# Patient Record
Sex: FEMALE | Race: WHITE | NOT HISPANIC OR LATINO | URBAN - METROPOLITAN AREA
[De-identification: names, ages, dates, MRNs, and addresses within clinical notes are randomized per-mention and may not be internally consistent; named-entity substitution may affect disease eponyms.]

---

## 2018-12-07 ENCOUNTER — INPATIENT (INPATIENT)
Facility: HOSPITAL | Age: 83
LOS: 2 days | Discharge: HOME | End: 2018-12-10
Attending: INTERNAL MEDICINE | Admitting: INTERNAL MEDICINE

## 2018-12-07 VITALS
SYSTOLIC BLOOD PRESSURE: 154 MMHG | HEART RATE: 65 BPM | OXYGEN SATURATION: 98 % | TEMPERATURE: 96 F | RESPIRATION RATE: 18 BRPM | DIASTOLIC BLOOD PRESSURE: 70 MMHG

## 2018-12-07 DIAGNOSIS — Z95.0 PRESENCE OF CARDIAC PACEMAKER: Chronic | ICD-10-CM

## 2018-12-07 LAB
ALBUMIN SERPL ELPH-MCNC: 3.9 G/DL — SIGNIFICANT CHANGE UP (ref 3.5–5.2)
ALP SERPL-CCNC: 53 U/L — SIGNIFICANT CHANGE UP (ref 30–115)
ALT FLD-CCNC: 10 U/L — SIGNIFICANT CHANGE UP (ref 0–41)
ANION GAP SERPL CALC-SCNC: 14 MMOL/L — SIGNIFICANT CHANGE UP (ref 7–14)
APTT BLD: 36.3 SEC — SIGNIFICANT CHANGE UP (ref 27–39.2)
AST SERPL-CCNC: 22 U/L — SIGNIFICANT CHANGE UP (ref 0–41)
BASOPHILS # BLD AUTO: 0.04 K/UL — SIGNIFICANT CHANGE UP (ref 0–0.2)
BASOPHILS NFR BLD AUTO: 0.7 % — SIGNIFICANT CHANGE UP (ref 0–1)
BILIRUB SERPL-MCNC: 0.5 MG/DL — SIGNIFICANT CHANGE UP (ref 0.2–1.2)
BUN SERPL-MCNC: 15 MG/DL — SIGNIFICANT CHANGE UP (ref 10–20)
CALCIUM SERPL-MCNC: 9.1 MG/DL — SIGNIFICANT CHANGE UP (ref 8.5–10.1)
CHLORIDE SERPL-SCNC: 103 MMOL/L — SIGNIFICANT CHANGE UP (ref 98–110)
CK MB CFR SERPL CALC: 1.6 NG/ML — SIGNIFICANT CHANGE UP (ref 0.6–6.3)
CK SERPL-CCNC: 73 U/L — SIGNIFICANT CHANGE UP (ref 0–225)
CO2 SERPL-SCNC: 24 MMOL/L — SIGNIFICANT CHANGE UP (ref 17–32)
CREAT SERPL-MCNC: 0.9 MG/DL — SIGNIFICANT CHANGE UP (ref 0.7–1.5)
D DIMER BLD IA.RAPID-MCNC: 138 NG/ML DDU — SIGNIFICANT CHANGE UP (ref 0–230)
EOSINOPHIL # BLD AUTO: 0.11 K/UL — SIGNIFICANT CHANGE UP (ref 0–0.7)
EOSINOPHIL NFR BLD AUTO: 1.9 % — SIGNIFICANT CHANGE UP (ref 0–8)
GLUCOSE SERPL-MCNC: 91 MG/DL — SIGNIFICANT CHANGE UP (ref 70–99)
HCT VFR BLD CALC: 38.8 % — SIGNIFICANT CHANGE UP (ref 37–47)
HCT VFR BLD CALC: 39.2 % — SIGNIFICANT CHANGE UP (ref 37–47)
HGB BLD-MCNC: 13 G/DL — SIGNIFICANT CHANGE UP (ref 12–16)
HGB BLD-MCNC: 13.1 G/DL — SIGNIFICANT CHANGE UP (ref 12–16)
IMM GRANULOCYTES NFR BLD AUTO: 0.3 % — SIGNIFICANT CHANGE UP (ref 0.1–0.3)
INR BLD: 1.34 RATIO — HIGH (ref 0.65–1.3)
LYMPHOCYTES # BLD AUTO: 1.28 K/UL — SIGNIFICANT CHANGE UP (ref 1.2–3.4)
LYMPHOCYTES # BLD AUTO: 21.7 % — SIGNIFICANT CHANGE UP (ref 20.5–51.1)
MAGNESIUM SERPL-MCNC: 2.3 MG/DL — SIGNIFICANT CHANGE UP (ref 1.8–2.4)
MCHC RBC-ENTMCNC: 28.7 PG — SIGNIFICANT CHANGE UP (ref 27–31)
MCHC RBC-ENTMCNC: 28.8 PG — SIGNIFICANT CHANGE UP (ref 27–31)
MCHC RBC-ENTMCNC: 33.4 G/DL — SIGNIFICANT CHANGE UP (ref 32–37)
MCHC RBC-ENTMCNC: 33.5 G/DL — SIGNIFICANT CHANGE UP (ref 32–37)
MCV RBC AUTO: 86 FL — SIGNIFICANT CHANGE UP (ref 81–99)
MCV RBC AUTO: 86 FL — SIGNIFICANT CHANGE UP (ref 81–99)
MONOCYTES # BLD AUTO: 0.43 K/UL — SIGNIFICANT CHANGE UP (ref 0.1–0.6)
MONOCYTES NFR BLD AUTO: 7.3 % — SIGNIFICANT CHANGE UP (ref 1.7–9.3)
NEUTROPHILS # BLD AUTO: 4.03 K/UL — SIGNIFICANT CHANGE UP (ref 1.4–6.5)
NEUTROPHILS NFR BLD AUTO: 68.1 % — SIGNIFICANT CHANGE UP (ref 42.2–75.2)
NRBC # BLD: 0 /100 WBCS — SIGNIFICANT CHANGE UP (ref 0–0)
NRBC # BLD: 0 /100 WBCS — SIGNIFICANT CHANGE UP (ref 0–0)
NT-PROBNP SERPL-SCNC: 207 PG/ML — SIGNIFICANT CHANGE UP (ref 0–300)
PHOSPHATE SERPL-MCNC: 3.5 MG/DL — SIGNIFICANT CHANGE UP (ref 2.1–4.9)
PLATELET # BLD AUTO: 180 K/UL — SIGNIFICANT CHANGE UP (ref 130–400)
PLATELET # BLD AUTO: 196 K/UL — SIGNIFICANT CHANGE UP (ref 130–400)
POTASSIUM SERPL-MCNC: 5 MMOL/L — SIGNIFICANT CHANGE UP (ref 3.5–5)
POTASSIUM SERPL-SCNC: 5 MMOL/L — SIGNIFICANT CHANGE UP (ref 3.5–5)
PROT SERPL-MCNC: 6.9 G/DL — SIGNIFICANT CHANGE UP (ref 6–8)
PROTHROM AB SERPL-ACNC: 15.4 SEC — HIGH (ref 9.95–12.87)
RBC # BLD: 4.51 M/UL — SIGNIFICANT CHANGE UP (ref 4.2–5.4)
RBC # BLD: 4.56 M/UL — SIGNIFICANT CHANGE UP (ref 4.2–5.4)
RBC # FLD: 14.7 % — HIGH (ref 11.5–14.5)
RBC # FLD: 14.7 % — HIGH (ref 11.5–14.5)
SODIUM SERPL-SCNC: 141 MMOL/L — SIGNIFICANT CHANGE UP (ref 135–146)
TROPONIN T SERPL-MCNC: <0.01 NG/ML — SIGNIFICANT CHANGE UP
WBC # BLD: 5.91 K/UL — SIGNIFICANT CHANGE UP (ref 4.8–10.8)
WBC # BLD: 6.31 K/UL — SIGNIFICANT CHANGE UP (ref 4.8–10.8)
WBC # FLD AUTO: 5.91 K/UL — SIGNIFICANT CHANGE UP (ref 4.8–10.8)
WBC # FLD AUTO: 6.31 K/UL — SIGNIFICANT CHANGE UP (ref 4.8–10.8)

## 2018-12-07 RX ORDER — ASPIRIN/CALCIUM CARB/MAGNESIUM 324 MG
325 TABLET ORAL ONCE
Qty: 0 | Refills: 0 | Status: COMPLETED | OUTPATIENT
Start: 2018-12-07 | End: 2018-12-07

## 2018-12-07 RX ORDER — ATORVASTATIN CALCIUM 80 MG/1
80 TABLET, FILM COATED ORAL AT BEDTIME
Qty: 0 | Refills: 0 | Status: DISCONTINUED | OUTPATIENT
Start: 2018-12-07 | End: 2018-12-10

## 2018-12-07 RX ORDER — ASPIRIN/CALCIUM CARB/MAGNESIUM 324 MG
81 TABLET ORAL DAILY
Qty: 0 | Refills: 0 | Status: DISCONTINUED | OUTPATIENT
Start: 2018-12-08 | End: 2018-12-10

## 2018-12-07 RX ORDER — VANCOMYCIN HCL 1 G
1000 VIAL (EA) INTRAVENOUS ONCE
Qty: 0 | Refills: 0 | Status: DISCONTINUED | OUTPATIENT
Start: 2018-12-07 | End: 2018-12-07

## 2018-12-07 RX ORDER — PANTOPRAZOLE SODIUM 20 MG/1
40 TABLET, DELAYED RELEASE ORAL DAILY
Qty: 0 | Refills: 0 | Status: DISCONTINUED | OUTPATIENT
Start: 2018-12-07 | End: 2018-12-10

## 2018-12-07 RX ORDER — LEVOTHYROXINE SODIUM 125 MCG
25 TABLET ORAL DAILY
Qty: 0 | Refills: 0 | Status: DISCONTINUED | OUTPATIENT
Start: 2018-12-07 | End: 2018-12-10

## 2018-12-07 RX ORDER — CHLORHEXIDINE GLUCONATE 213 G/1000ML
1 SOLUTION TOPICAL
Qty: 0 | Refills: 0 | Status: DISCONTINUED | OUTPATIENT
Start: 2018-12-07 | End: 2018-12-10

## 2018-12-07 RX ORDER — MORPHINE SULFATE 50 MG/1
4 CAPSULE, EXTENDED RELEASE ORAL ONCE
Qty: 0 | Refills: 0 | Status: DISCONTINUED | OUTPATIENT
Start: 2018-12-07 | End: 2018-12-07

## 2018-12-07 RX ORDER — METOPROLOL TARTRATE 50 MG
25 TABLET ORAL AT BEDTIME
Qty: 0 | Refills: 0 | Status: DISCONTINUED | OUTPATIENT
Start: 2018-12-07 | End: 2018-12-10

## 2018-12-07 RX ORDER — RIVAROXABAN 15 MG-20MG
20 KIT ORAL EVERY 24 HOURS
Qty: 0 | Refills: 0 | Status: DISCONTINUED | OUTPATIENT
Start: 2018-12-07 | End: 2018-12-10

## 2018-12-07 RX ADMIN — RIVAROXABAN 20 MILLIGRAM(S): KIT at 21:37

## 2018-12-07 RX ADMIN — Medication 325 MILLIGRAM(S): at 13:08

## 2018-12-07 RX ADMIN — PANTOPRAZOLE SODIUM 40 MILLIGRAM(S): 20 TABLET, DELAYED RELEASE ORAL at 21:46

## 2018-12-07 RX ADMIN — Medication 25 MILLIGRAM(S): at 21:37

## 2018-12-07 RX ADMIN — ATORVASTATIN CALCIUM 80 MILLIGRAM(S): 80 TABLET, FILM COATED ORAL at 21:37

## 2018-12-07 NOTE — H&P ADULT - NSHPPHYSICALEXAM_GEN_ALL_CORE
PHYSICAL EXAM:  GENERAL: NAD, obese  HEAD:  Atraumatic, Normocephalic  EYES: EOMI, PERRLA,   ENMT: No tonsillar erythema, exudates, or enlargement  NECK: Supple, No JVD, Normal thyroid  NERVOUS SYSTEM:  Alert & Oriented X3  CHEST/LUNG: Clear to percussion bilaterally; No rales, rhonchi, wheezing, or rubs  HEART: Regular rate and rhythm; No murmurs, rubs, or gallops  ABDOMEN: Soft, Nontender, Nondistended; Bowel sounds present  EXTREMITIES:  2+ Peripheral Pulses, No clubbing, cyanosis, or edema  LYMPH: No lymphadenopathy noted  SKIN: No rashes or lesions

## 2018-12-07 NOTE — H&P ADULT - ASSESSMENT
82 yo F presents with worsened intensity of left-sided chest pain occurring yesterday at 06:00AM, currently asymptomatic, chest pain has occurred intermittently for the past several years.    Atypical Chest Pain, rule out Acute Coronary Syndrome  -EKG shows atrial paced rhythm, negative ischemic changes  -Cardiac enzymes negative x2, follow up subsequent set  -Last Nuclear Stress completed on 2/22/16 normal, EF 70-75%  -No overt sign of volume overload  -Awaiting Cardiology evaluation, Dr. Soto  -Continue Metoprolol, Statin, received 325mg of ASA x1, will continue ASA-81mg at this time  -F/U 2D-Echo  -Check HgbA1C%, lipid panel    Hypothyroidism  -Continue Levothyroxine    Code Status: Full Code  Disposition Home when stable 82 yo F presents with worsened intensity of left-sided chest pain occurring yesterday at 06:00AM, currently asymptomatic, chest pain has occurred intermittently for the past several years.    Atypical Chest Pain, rule out Acute Coronary Syndrome  -EKG shows atrial paced rhythm, negative ischemic changes  -Cardiac enzymes negative x2, follow up subsequent set  -Last Nuclear Stress completed on 2/22/16 normal, EF 70-75%  -No overt sign of volume overload  -Awaiting Cardiology evaluation, Dr. Soto  -Continue Metoprolol, Statin, received 325mg of ASA x1, will continue ASA-81mg at this time  -F/U 2D-Echo  -Check TSH, HgbA1C%, lipid panel    Hypothyroidism  -Continue Levothyroxine    Code Status: Full Code  Disposition Home when stable

## 2018-12-07 NOTE — ED PROVIDER NOTE - OBJECTIVE STATEMENT
82 y/o F, h/o PM, afib, on xaralto, presents with intermittent L sided CP described as tightness/squeezing onset yesterday while sitting. pt noticed her CP is minimally relieved with rest. Pt last saw her cardiologist-Dr. Soto last month and last had a stress test about 2 years ago and had a normal EF. No simialr symptoms in the past. denies SOB, palpitations, diaphoresis, n/v, abd pain. 82 y/o F, h/o PM, afib, on xaralto, presents with intermittent L sided CP described as tightness/squeezing onset yesterday while sitting. pt noticed her CP is minimally relieved with rest. Pt last saw her cardiologist-Dr. Soto last month and last had a stress test about 2 years ago and had a normal EF. no releif of symptoms with NTG patch. No simialr symptoms in the past. denies SOB, palpitations, diaphoresis, n/v, abd pain.

## 2018-12-07 NOTE — ED ADULT NURSE NOTE - NSIMPLEMENTINTERV_GEN_ALL_ED
Implemented All Universal Safety Interventions:  [place] to call system. Call bell, personal items and telephone within reach. Instruct patient to call for assistance. Room bathroom lighting operational. Non-slip footwear when patient is off stretcher. Physically safe environment: no spills, clutter or unnecessary equipment. Stretcher in lowest position, wheels locked, appropriate side rails in place.

## 2018-12-07 NOTE — H&P ADULT - ATTENDING COMMENTS
Patient is seen and examined independently at bedside. I agree with the resident's note, history and plan as above.    PHYSICAL EXAM:  CONSTITUTIONAL: NAD, well-groomed, well-developed.  HEAD:  Atraumatic, Normocephalic.  EYES: EOMI, PERRLA, conjunctiva and sclera clear.  ENMT: Moist mucous membranes, No lesions.  NERVOUS SYSTEM:  Alert & Oriented X3, Good concentration; No limb weakness or numbness.  RESPIRATORY: Clear to ascultation bilaterally; No rales, rhonchi, wheezing, or rubs.  CARDIOVASCULAR: Regular rate and rhythm; No murmurs, rubs, or gallops.  GASTROINTESTINAL: Soft, Nontender, Nondistended; Bowel sounds present.  MUSCULOSKELETAL: No joint swelling or tenderness. No neck or back pain.  EXTREMITIES: No clubbing, cyanosis, or edema.  LYMPH: No cervical lymphadenopathy noted.  SKIN: No rashes or lesions.    # Chest pain   - EKG and telemetry independent reviewed, atrial paced rhythm with no ischemic changes HR 60s  - telemonitoring  - started on aspirin, c/w statin and toprol xl  - order for 2DEcho  - check TSH, HgbA1C%, lipid panel  - Cardiology evaluation for possible need for stress test    # Left lower lobe opacity vs. effusion on CXR, previously read as atelectasis in CXR 2016  - no fever, cough, sputum production, no significant finding on physical exam, will hold antibiotics for now  - monitor for symptoms    # h/o Atrial fibrillation  - c/w xarelto and toprol xl    # Hypothyroidism  - c/w levothyroxine    # Hyperlipidemia  - c/w statin    All labs, radiologic studies, vitals, orders and medications list reviewed. Patient is seen and examined independently at bedside. I agree with the resident's note, history and plan as above.    PHYSICAL EXAM:  CONSTITUTIONAL: NAD, well-groomed, well-developed.  HEAD:  Atraumatic, Normocephalic.  EYES: EOMI, PERRLA, conjunctiva and sclera clear.  ENMT: Moist mucous membranes, No lesions.  NERVOUS SYSTEM:  Alert & Oriented X3, Good concentration; No limb weakness or numbness.  RESPIRATORY: Clear to ascultation bilaterally; No rales, rhonchi, wheezing, or rubs.  CARDIOVASCULAR: Regular rate and rhythm; No murmurs, rubs, or gallops.  GASTROINTESTINAL: Soft, Nontender, Nondistended; Bowel sounds present.  MUSCULOSKELETAL: No joint swelling or tenderness. No neck or back pain.  EXTREMITIES: No clubbing, cyanosis, or edema.  LYMPH: No cervical lymphadenopathy noted.  SKIN: No rashes or lesions.    # Chest pain   - EKG and telemetry independent reviewed, atrial paced rhythm with no ischemic changes HR 60s  - telemonitoring  - started on aspirin, c/w statin and toprol xl  - order for 2DEcho  - check TSH, HgbA1C%, lipid panel  - Cardiology evaluation for possible need for stress test    # Left lower lobe opacity vs. effusion on CXR, previously read as atelectasis in CXR 2016  - no fever, cough, sputum production, no significant finding on physical exam, will hold antibiotics for now  - monitor for symptoms    # h/o Atrial fibrillation  - c/w xarelto and toprol xl    # Hypothyroidism  - c/w levothyroxine    # Hyperlipidemia  - c/w statin    # GERD  - pt takes zantac or prilosec prn at home, currently symptomatic  - will start on protonix daily for now    All labs, radiologic studies, vitals, orders and medications list reviewed.

## 2018-12-07 NOTE — H&P ADULT - NSHPREVIEWOFSYSTEMS_GEN_ALL_CORE
REVIEW OF SYSTEMS:  CONSTITUTIONAL: No fever, weight loss, or fatigue  EYES: No eye pain, visual disturbances, or discharge  ENMT:  No difficulty hearing, tinnitus, vertigo; No sinus or throat pain  NECK: No pain or stiffness  BREASTS: No pain, masses, or nipple discharge  RESPIRATORY: No cough, wheezing, chills or hemoptysis; No shortness of breath  CARDIOVASCULAR: positive chest pain, negative palpitations, dizziness, or leg swelling  GASTROINTESTINAL: No abdominal or epigastric pain. No nausea, vomiting, or hematemesis; No diarrhea or constipation. No melena or hematochezia.  GENITOURINARY: No dysuria, frequency, hematuria, or incontinence  NEUROLOGICAL: No headaches, memory loss, loss of strength, numbness, or tremors  SKIN: No itching, burning, rashes, or lesions   LYMPH NODES: No enlarged glands  ENDOCRINE: No heat or cold intolerance; No hair loss  MUSCULOSKELETAL: No joint pain or swelling; No muscle, back, or extremity pain  PSYCHIATRIC: No depression, anxiety, mood swings, or difficulty sleeping  HEME/LYMPH: No easy bruising, or bleeding gums  ALLERGY AND IMMUNOLOGIC: No hives or eczema

## 2018-12-07 NOTE — H&P ADULT - NSHPLABSRESULTS_GEN_ALL_CORE
13.0   5.91  )-----------( 196      ( 07 Dec 2018 11:37 )             38.8       12-07    141  |  103  |  15  ----------------------------<  91  5.0   |  24  |  0.9    Ca    9.1      07 Dec 2018 11:23  Phos  3.5     12-07  Mg     2.3     12-07    TPro  6.9  /  Alb  3.9  /  TBili  0.5  /  DBili  x   /  AST  22  /  ALT  10  /  AlkPhos  53  12-07          PT/INR - ( 07 Dec 2018 11:23 )   PT: 15.40 sec;   INR: 1.34 ratio         PTT - ( 07 Dec 2018 11:23 )  PTT:36.3 sec      CARDIAC MARKERS ( 07 Dec 2018 11:37 )  x     / <0.01 ng/mL / x     / x     / x      CARDIAC MARKERS ( 07 Dec 2018 11:23 )  x     / <0.01 ng/mL / x     / x     / x

## 2018-12-07 NOTE — H&P ADULT - NSHPSOCIALHISTORY_GEN_ALL_CORE
Patient lives alone with home health aide visiting twice a week. She walks with a cane, denies history of tobacco, ETOH or illicit drug abuse.

## 2018-12-07 NOTE — H&P ADULT - FAMILY HISTORY
Sibling  Still living? Yes, Estimated age: Age Unknown  Family history of breast cancer, Age at diagnosis: Age Unknown     Mother  Still living? No  Family history of early CAD, Age at diagnosis: Age Unknown

## 2018-12-07 NOTE — H&P ADULT - HISTORY OF PRESENT ILLNESS
82 yo F with PMHx of AFIB on Xarelto presents with complaint of intermittent left sided chest pain beginning yesterday.  described as tightness/squeezing onset yesterday while sitting. pt noticed her CP is minimally relieved with rest. Pt last saw her cardiologist-Dr. Soto last month and last had a stress test about 2 years ago and had a normal EF. no releif of symptoms with NTG patch. No simialr symptoms in the past. denies SOB, palpitations, diaphoresis, n/v, abd pain. 82 yo F with PMHx of AFIB on Xarelto, Conduction Disorder s/p Pacemaker placement, Hypothyroidism, HLD presents with complaint of worsening left sided chest pain with intermittent radiation down left upper extremity occurring at 6AM yesterday morning. Patient endorses "squeezing" pain lasting for several minutes sometimes relieved by nitro patch (however is affected by side effects of headaches); patient describes intermittent chest pain occurring for the past "several years". Patient follows up with Dr. Soto, was seen last month with PPM interrogation was told she was doing fine with no changes to her medications. Patient denies palpitations, shortness of breath, fever, chills, nausea, vomiting, sick contacts, recent travel or upper respiratory tract symptoms (in light of Left Lower Lobe opacity seen on CXR). 84 yo F with PMHx of AFIB on Xarelto, Conduction Disorder s/p Pacemaker placement, Hypothyroidism, HLD presents with complaint of worsening left sided chest pain with intermittent radiation down left upper extremity occurring at 6AM yesterday morning. Patient endorses "squeezing" pain lasting for several minutes sometimes relieved by nitro patch (however is affected by side effects of headaches); patient describes intermittent chest pain occurring for the past "several years". Patient follows up with Dr. Soto, was seen last month with PPM interrogation was told she was doing fine with no changes to her medications. Patient denies palpitations, shortness of breath, fever, chills, nausea, vomiting, sick contacts, recent travel or upper respiratory tract symptoms (in light of Left Lower Lobe opacity seen on CXR--also noted on CXR in 2016).

## 2018-12-07 NOTE — ED PROVIDER NOTE - NS ED ROS FT
Review of Systems:  CONSTITUTIONAL: no fever  EYES: no photophobia, no blurred vision  ENT: no ear pain, no nasal discharge  RESPIRATORY: no shortness of breath, no cough  CARDIAC: +chest pain, no palpitations  GI: no abd pain, no nausea, no vomiting, no diarrhea, no constipation,   : no dysuria; no hematuria,   MUSCULOSKELETAL: no joint paint  NEUROLOGIC: no headache,   PSYCH: no anxiety, non suicidal,

## 2018-12-07 NOTE — H&P ADULT - PMH
Atrial fibrillation, unspecified type    Hyperlipidemia, unspecified hyperlipidemia type    Hypothyroidism, unspecified type

## 2018-12-08 LAB
ANION GAP SERPL CALC-SCNC: 16 MMOL/L — HIGH (ref 7–14)
BUN SERPL-MCNC: 17 MG/DL — SIGNIFICANT CHANGE UP (ref 10–20)
CALCIUM SERPL-MCNC: 8.9 MG/DL — SIGNIFICANT CHANGE UP (ref 8.5–10.1)
CHLORIDE SERPL-SCNC: 104 MMOL/L — SIGNIFICANT CHANGE UP (ref 98–110)
CHOLEST SERPL-MCNC: 228 MG/DL — HIGH (ref 100–200)
CK MB CFR SERPL CALC: 2 NG/ML — SIGNIFICANT CHANGE UP (ref 0.6–6.3)
CK SERPL-CCNC: 88 U/L — SIGNIFICANT CHANGE UP (ref 0–225)
CO2 SERPL-SCNC: 22 MMOL/L — SIGNIFICANT CHANGE UP (ref 17–32)
CREAT SERPL-MCNC: 0.9 MG/DL — SIGNIFICANT CHANGE UP (ref 0.7–1.5)
ESTIMATED AVERAGE GLUCOSE: 111 MG/DL — SIGNIFICANT CHANGE UP (ref 68–114)
GLUCOSE SERPL-MCNC: 91 MG/DL — SIGNIFICANT CHANGE UP (ref 70–99)
HBA1C BLD-MCNC: 5.5 % — SIGNIFICANT CHANGE UP (ref 4–5.6)
HCT VFR BLD CALC: 38.8 % — SIGNIFICANT CHANGE UP (ref 37–47)
HDLC SERPL-MCNC: 66 MG/DL — SIGNIFICANT CHANGE UP
HGB BLD-MCNC: 13 G/DL — SIGNIFICANT CHANGE UP (ref 12–16)
LIPID PNL WITH DIRECT LDL SERPL: 152 MG/DL — HIGH (ref 4–129)
MAGNESIUM SERPL-MCNC: 2.1 MG/DL — SIGNIFICANT CHANGE UP (ref 1.8–2.4)
MCHC RBC-ENTMCNC: 29 PG — SIGNIFICANT CHANGE UP (ref 27–31)
MCHC RBC-ENTMCNC: 33.5 G/DL — SIGNIFICANT CHANGE UP (ref 32–37)
MCV RBC AUTO: 86.4 FL — SIGNIFICANT CHANGE UP (ref 81–99)
NRBC # BLD: 0 /100 WBCS — SIGNIFICANT CHANGE UP (ref 0–0)
PLATELET # BLD AUTO: 180 K/UL — SIGNIFICANT CHANGE UP (ref 130–400)
POTASSIUM SERPL-MCNC: 4 MMOL/L — SIGNIFICANT CHANGE UP (ref 3.5–5)
POTASSIUM SERPL-SCNC: 4 MMOL/L — SIGNIFICANT CHANGE UP (ref 3.5–5)
RBC # BLD: 4.49 M/UL — SIGNIFICANT CHANGE UP (ref 4.2–5.4)
RBC # FLD: 14.7 % — HIGH (ref 11.5–14.5)
SODIUM SERPL-SCNC: 142 MMOL/L — SIGNIFICANT CHANGE UP (ref 135–146)
TOTAL CHOLESTEROL/HDL RATIO MEASUREMENT: 3.5 RATIO — LOW (ref 4–5.5)
TRIGL SERPL-MCNC: 78 MG/DL — SIGNIFICANT CHANGE UP (ref 10–149)
TROPONIN T SERPL-MCNC: <0.01 NG/ML — SIGNIFICANT CHANGE UP
TSH SERPL-MCNC: 3.57 UIU/ML — SIGNIFICANT CHANGE UP (ref 0.27–4.2)
WBC # BLD: 6.2 K/UL — SIGNIFICANT CHANGE UP (ref 4.8–10.8)
WBC # FLD AUTO: 6.2 K/UL — SIGNIFICANT CHANGE UP (ref 4.8–10.8)

## 2018-12-08 RX ORDER — DOCUSATE SODIUM 100 MG
100 CAPSULE ORAL THREE TIMES A DAY
Qty: 0 | Refills: 0 | Status: DISCONTINUED | OUTPATIENT
Start: 2018-12-08 | End: 2018-12-10

## 2018-12-08 RX ORDER — SENNA PLUS 8.6 MG/1
1 TABLET ORAL
Qty: 0 | Refills: 0 | Status: DISCONTINUED | OUTPATIENT
Start: 2018-12-08 | End: 2018-12-10

## 2018-12-08 RX ADMIN — SENNA PLUS 1 TABLET(S): 8.6 TABLET ORAL at 17:34

## 2018-12-08 RX ADMIN — Medication 25 MILLIGRAM(S): at 23:04

## 2018-12-08 RX ADMIN — Medication 25 MICROGRAM(S): at 06:38

## 2018-12-08 RX ADMIN — RIVAROXABAN 20 MILLIGRAM(S): KIT at 17:34

## 2018-12-08 RX ADMIN — ATORVASTATIN CALCIUM 80 MILLIGRAM(S): 80 TABLET, FILM COATED ORAL at 23:04

## 2018-12-08 RX ADMIN — PANTOPRAZOLE SODIUM 40 MILLIGRAM(S): 20 TABLET, DELAYED RELEASE ORAL at 12:16

## 2018-12-08 RX ADMIN — Medication 100 MILLIGRAM(S): at 15:00

## 2018-12-08 RX ADMIN — Medication 81 MILLIGRAM(S): at 12:16

## 2018-12-08 RX ADMIN — SENNA PLUS 1 TABLET(S): 8.6 TABLET ORAL at 06:38

## 2018-12-08 RX ADMIN — Medication 100 MILLIGRAM(S): at 06:38

## 2018-12-08 RX ADMIN — Medication 100 MILLIGRAM(S): at 23:04

## 2018-12-09 RX ADMIN — Medication 25 MICROGRAM(S): at 05:54

## 2018-12-09 RX ADMIN — SENNA PLUS 1 TABLET(S): 8.6 TABLET ORAL at 05:54

## 2018-12-09 RX ADMIN — Medication 100 MILLIGRAM(S): at 05:54

## 2018-12-09 RX ADMIN — Medication 100 MILLIGRAM(S): at 12:20

## 2018-12-09 RX ADMIN — Medication 25 MILLIGRAM(S): at 21:56

## 2018-12-09 RX ADMIN — ATORVASTATIN CALCIUM 80 MILLIGRAM(S): 80 TABLET, FILM COATED ORAL at 21:56

## 2018-12-09 RX ADMIN — SENNA PLUS 1 TABLET(S): 8.6 TABLET ORAL at 18:01

## 2018-12-09 RX ADMIN — Medication 81 MILLIGRAM(S): at 12:20

## 2018-12-09 RX ADMIN — RIVAROXABAN 20 MILLIGRAM(S): KIT at 18:01

## 2018-12-09 RX ADMIN — PANTOPRAZOLE SODIUM 40 MILLIGRAM(S): 20 TABLET, DELAYED RELEASE ORAL at 12:20

## 2018-12-09 RX ADMIN — Medication 100 MILLIGRAM(S): at 21:56

## 2018-12-09 NOTE — CONSULT NOTE ADULT - SUBJECTIVE AND OBJECTIVE BOX
Patient is a 83y old  Female who presents with a chief complaint of chest pain (08 Dec 2018 07:36)    HPI:  84 yo F with PMHx of AFIB on Xarelto, Sinus arrest s/p Pacemaker placement, Hypothyroidism, HLD presents with complaint of worsening left sided chest pain with intermittent radiation down left upper extremity occurring for last 2 days. Patient endorses "squeezing" pain lasting for several minutes sometimes relieved by nitro patch (however is affected by side effects of headaches); patient describes intermittent chest pain occurring for the past "several years". Pain occurs randomly, unrelated to exertion, lasts 20 mins and resolves with nitro. Pt states she underwent cardiac cath 4 yrs  ago which didnot show any CAD. Patient follows up with Dr. Soto, was seen last month with PPM interrogation, was told she was doing fine with no changes to her medications. Patient denies palpitations, shortness of breath, fever, chills, nausea, vomiting, sick contacts, recent travel.      ROS:  All other systems reviewed and are negative    PAST MEDICAL & SURGICAL HISTORY  Hyperlipidemia, unspecified hyperlipidemia type  Hypothyroidism, unspecified type  Atrial fibrillation, unspecified type  H/O cardiac pacemaker      FAMILY HISTORY:  FAMILY HISTORY:  Family history of early CAD (Mother)  Family history of breast cancer (Sibling)      SOCIAL HISTORY:  [-]smoker  [-]Alcohol  [-]Drug    ALLERGIES:  No Known Allergies      MEDICATIONS:  MEDICATIONS  (STANDING):  aspirin  chewable 81 milliGRAM(s) Oral daily  atorvastatin 80 milliGRAM(s) Oral at bedtime  chlorhexidine 4% Liquid 1 Application(s) Topical <User Schedule>  docusate sodium 100 milliGRAM(s) Oral three times a day  levothyroxine 25 MICROGram(s) Oral daily  metoprolol succinate ER 25 milliGRAM(s) Oral at bedtime  pantoprazole    Tablet 40 milliGRAM(s) Oral daily  rivaroxaban 20 milliGRAM(s) Oral every 24 hours  senna 1 Tablet(s) Oral two times a day    MEDICATIONS  (PRN):      HOME MEDICATIONS:  Home Medications:  Crestor: 20 milligram(s) orally once a day (07 Dec 2018 16:32)  dicyclomine: 10 milligram(s) orally once a day, As Needed (07 Dec 2018 16:31)  levothyroxine 25 mcg (0.025 mg) oral tablet: 1 tab(s) orally once a day (07 Dec 2018 16:31)  Metoprolol Succinate ER 25 mg oral tablet, extended release: 1 tab(s) orally once a day (at bedtime) (07 Dec 2018 16:32)  nitroglycerin 0.4 mg/hr transdermal film, extended release:  (07 Dec 2018 16:32)  Xarelto 20 mg oral tablet: 1 tab(s) orally once a day (in the evening) (07 Dec 2018 16:31)      VITALS:   T(F): 97.1 (12-08 @ 20:59), Max: 98 (12-07 @ 21:07)  HR: 67 (12-08 @ 20:59) (60 - 68)  BP: 138/64 (12-08 @ 20:59) (105/58 - 154/70)  BP(mean): --  RR: 18 (12-08 @ 20:59) (18 - 18)  SpO2: 98% (12-07 @ 10:20) (98% - 98%)    I&O's Summary      PHYSICAL EXAM:  GEN: Alert and oriented X 3, No acute distress  HEENT: no pallor  NECK: Supple, no JVD  LUNGS: Clear to auscultation bilaterally  CARDIOVASCULAR: S1/S2 regular, no murmurs or rubs  ABD: Soft, BS+  EXT: No Lower extremity edema/cyanosis  NEURO: Non focal  SKIN: Intact    LABS:                        13.0   6.20  )-----------( 180      ( 08 Dec 2018 07:33 )             38.8     12-08    142  |  104  |  17  ----------------------------<  91  4.0   |  22  |  0.9    Ca    8.9      08 Dec 2018 07:33  Phos  3.5     12-07  Mg     2.1     12-08    TPro  6.9  /  Alb  3.9  /  TBili  0.5  /  DBili  x   /  AST  22  /  ALT  10  /  AlkPhos  53  12-07    PT/INR - ( 07 Dec 2018 11:23 )   PT: 15.40 sec;   INR: 1.34 ratio         PTT - ( 07 Dec 2018 11:23 )  PTT:36.3 sec  Creatine Kinase, Serum: 88 U/L (12-08-18 @ 07:33)  Troponin T, Serum: <0.01 ng/mL (12-08-18 @ 07:33)    CARDIAC MARKERS ( 08 Dec 2018 07:33 )  x     / <0.01 ng/mL / 88 U/L / x     / 2.0 ng/mL  CARDIAC MARKERS ( 07 Dec 2018 21:49 )  x     / <0.01 ng/mL / 73 U/L / x     / 1.6 ng/mL  CARDIAC MARKERS ( 07 Dec 2018 11:37 )  x     / <0.01 ng/mL / x     / x     / x      CARDIAC MARKERS ( 07 Dec 2018 11:23 )  x     / <0.01 ng/mL / x     / x     / x            Troponin trend:    Serum Pro-Brain Natriuretic Peptide: 207 pg/mL (12-07-18 @ 11:23)    12-08 Chol 228<H> <H> HDL 66 Trig 78  Hemoglobin A1C Hemoglobin A1C, Whole Blood: 5.5 % (12-08-18 @ 07:33)    Thyroid      RADIOLOGY:  -CXR:  < from: Xray Chest 1 View AP/PA (12.07.18 @ 12:42) >  Impression:      Left lower lobe opacity/effusion.    < end of copied text >    -TTE: 2016: 55-65 % EF  -STRESS TEST: 2016: no ischemia       ECG:  < from: 12 Lead ECG (12.07.18 @ 12:43) >    Diagnosis Line Atrial-paced rhythm  Incomplete right bundle branch block  Abnormal ECG    < end of copied text >    TELEMETRY EVENTS:  none

## 2018-12-09 NOTE — CONSULT NOTE ADULT - ASSESSMENT
82 yo F with PMHx of AFIB on Xarelto, Sinus arrest s/p Pacemaker placement, Hypothyroidism, HLD presents with complaint of worsening left sided chest pain with intermittent radiation down left upper extremity occurring for last 2 days.       Intermittent chest pain- chronic., got worse recently: microvascular angina vs esophageal spasm vs new occlusive CAD  Afib on Xarelto      -CE -ve  -ECG no ischemic changes  -check ECHO to assess EF and RWM  -cont Xarelto, BB, statin  -will consider cardiac cath 84 yo F with PMHx of AFIB on Xarelto, Sinus arrest s/p Pacemaker placement, Hypothyroidism, HLD presents with complaint of worsening left sided chest pain with intermittent radiation down left upper extremity occurring for last 2 days.       Intermittent chest pain- chronic., got worse recently: microvascular angina vs esophageal spasm vs new occlusive CAD  Afib on Xarelto      -CE -ve  -ECG no ischemic changes  -check ECHO to assess EF and RWM  -cont Xarelto, BB, statin  -Nuclear stress test versus cardiac cath/CCTA  -Will F/U

## 2018-12-10 ENCOUNTER — TRANSCRIPTION ENCOUNTER (OUTPATIENT)
Age: 83
End: 2018-12-10

## 2018-12-10 VITALS
SYSTOLIC BLOOD PRESSURE: 153 MMHG | RESPIRATION RATE: 18 BRPM | HEART RATE: 71 BPM | TEMPERATURE: 96 F | DIASTOLIC BLOOD PRESSURE: 97 MMHG

## 2018-12-10 LAB
CK MB CFR SERPL CALC: 1.8 NG/ML — SIGNIFICANT CHANGE UP (ref 0.6–6.3)
CK SERPL-CCNC: 82 U/L — SIGNIFICANT CHANGE UP (ref 0–225)
TROPONIN T SERPL-MCNC: <0.01 NG/ML — SIGNIFICANT CHANGE UP

## 2018-12-10 RX ORDER — ADENOSINE 3 MG/ML
60 INJECTION INTRAVENOUS ONCE
Qty: 0 | Refills: 0 | Status: DISCONTINUED | OUTPATIENT
Start: 2018-12-10 | End: 2018-12-10

## 2018-12-10 RX ORDER — ASPIRIN/CALCIUM CARB/MAGNESIUM 324 MG
1 TABLET ORAL
Qty: 0 | Refills: 0 | DISCHARGE
Start: 2018-12-10

## 2018-12-10 RX ADMIN — Medication 100 MILLIGRAM(S): at 17:35

## 2018-12-10 RX ADMIN — PANTOPRAZOLE SODIUM 40 MILLIGRAM(S): 20 TABLET, DELAYED RELEASE ORAL at 11:42

## 2018-12-10 RX ADMIN — SENNA PLUS 1 TABLET(S): 8.6 TABLET ORAL at 17:36

## 2018-12-10 RX ADMIN — Medication 81 MILLIGRAM(S): at 11:42

## 2018-12-10 RX ADMIN — CHLORHEXIDINE GLUCONATE 1 APPLICATION(S): 213 SOLUTION TOPICAL at 05:55

## 2018-12-10 RX ADMIN — Medication 100 MILLIGRAM(S): at 05:55

## 2018-12-10 RX ADMIN — Medication 25 MICROGRAM(S): at 05:55

## 2018-12-10 RX ADMIN — RIVAROXABAN 20 MILLIGRAM(S): KIT at 17:36

## 2018-12-10 NOTE — DISCHARGE NOTE ADULT - MEDICATION SUMMARY - MEDICATIONS TO TAKE
I will START or STAY ON the medications listed below when I get home from the hospital:    aspirin 81 mg oral tablet, chewable  -- 1 tab(s) by mouth once a day  -- Indication: For Chest pain    nitroglycerin 0.4 mg/hr transdermal film, extended release  -- Indication: For Chest pain    Xarelto 20 mg oral tablet  -- 1 tab(s) by mouth once a day (in the evening)  -- Indication: For Atrial fibrillation, unspecified type    Crestor  -- 20 milligram(s) by mouth once a day  -- Indication: For Hyperlipidemia, unspecified hyperlipidemia type    Metoprolol Succinate ER 25 mg oral tablet, extended release  -- 1 tab(s) by mouth once a day (at bedtime)  -- Indication: For Chest pain    dicyclomine  -- 10 milligram(s) by mouth once a day, As Needed  -- Indication: For dyspepsia    levothyroxine 25 mcg (0.025 mg) oral tablet  -- 1 tab(s) by mouth once a day  -- Indication: For Hypothyroidism, unspecified type

## 2018-12-10 NOTE — DISCHARGE NOTE ADULT - PLAN OF CARE
resolution of symptoms Cardiac workup was negative in the hospital. Advised to obtain a mammogram as outpatient and f/u with your doctors medical management continue with Metoprolol and Xarelto and see your doctors

## 2018-12-10 NOTE — DISCHARGE NOTE ADULT - CARE PROVIDER_API CALL
James Soto), Cardiology; Internal Medicine  [address] [place] [address]  Phone: [phone]  Fax: [phone]    Margareth Calle  Phone: (   )    -  Fax: (   )    -

## 2018-12-10 NOTE — DISCHARGE NOTE ADULT - CARE PLAN
Principal Discharge DX:	Chest pain  Goal:	resolution of symptoms  Assessment and plan of treatment:	Cardiac workup was negative in the hospital. Advised to obtain a mammogram as outpatient and f/u with your doctors  Secondary Diagnosis:	Atrial fibrillation, unspecified type  Goal:	medical management  Assessment and plan of treatment:	continue with Metoprolol and Xarelto and see your doctors

## 2018-12-10 NOTE — DISCHARGE NOTE ADULT - PATIENT PORTAL LINK FT
You can access the [facility] Portal, offered by [facility], by registering with the following website: [email or web address]

## 2018-12-10 NOTE — PROGRESS NOTE ADULT - ASSESSMENT
84 yo F with PMHx of AFIB on Xarelto, Conduction Disorder s/p Pacemaker placement, Hypothyroidism, HLD presents with complaint of worsening left sided chest pain with intermittent radiation down left upper extremity, back, burning in character, lasting approx 30 mins, with multiple similar episodes. Unrelated to activity    ·	Atypical CP, unlikely ACS  possibly related to PPM location (L breast TTP where ppm is)  EKG- unchanged, CE negx3  no further episodes during admission  seen Dr Soto 1 month ago, stable, no change in mgmt/meds  TSH 3.57, Hba1c 5.5    c/w ASA, statin toprol XL  echo done, pending read  -cardio eval pending    ·	Left lower lobe opacity vs. effusion on CXR,   previously read as atelectasis in CXR 2016  - no fever, cough, sputum production, no significant finding on physical exam  -check PA/Lat Xray for better eval  - monitor for symptoms  -no indication for abx at this time    ·	Atrial fibrillation  - c/w xarelto and toprol xl  ·	  ·	Hypothyroidism  - c/w levothyroxine    ·	GERD  -protonix    DVT ppx  Dispo: Lives alone with 12h/day HHA with family nearby  anticipate dc home tomorrow P echo read/cardio eval
82 yo F with PMHx of AFIB on Xarelto, Conduction Disorder s/p Pacemaker placement, Hypothyroidism, HLD presents with complaint of worsening left sided chest pain with intermittent radiation down left upper extremity occurring at 6AM yesterday morning.     #) Atypical CP, unlikely ACS  -last night complained of left sided chest pain radiating to left arm, going for cardiac cath today at 4pm  -possibly related to PPM location (L breast TTP where ppm is), EKG- unchanged, CE negx3  -seen Dr Soto 1 month ago, stable, no change in mgmt/meds  -TSH 3.57, Hba1c 5.5,   -c/w ASA, statin toprol XL  -2D Echo (12/09)-  1. Left ventricular ejection fraction, by visual estimation, is 55 to 60%. 2. Normal global left ventricular systolic function. 3. Spectral Doppler shows impaired relaxation pattern of left ventricular myocardial filling (Grade I diastolic dysfunction). 4. Mild tricuspid regurgitation. 5. Trace pulmonic valve regurgitation.  -cardio c/s (Dr. Benoit)- Intermittent chest pain- chronic., got worse recently: microvascular angina vs esophageal spasm vs new occlusive CAD, Afib on Xarelto, -CE -ve, -ECG no ischemic changes, -check ECHO to assess EF and RWM, -cont Xarelto, BB, statin, -Nuclear stress test versus cardiac cath/CCTA, -Will F/U    #) Left lower lobe opacity vs. effusion on CXR,   previously read as atelectasis in CXR 2016  - no fever, cough, sputum production, no significant finding on physical exam  -check PA/Lat Xray for better eval  - monitor for symptoms  -no indication for abx at this time    #) Atrial fibrillation  - c/w xarelto and toprol xl  	  #) Hypothyroidism  - c/w levothyroxine    #) GERD  -protonix    #) DVT ppx  #) Dispo: Lives alone with 12h/day HHA with family nearby
84 yo F with PMHx of AFIB on Xarelto, Conduction Disorder s/p Pacemaker placement, Hypothyroidism, HLD presents with complaint of worsening left sided chest pain with intermittent radiation down left upper extremity occurring at 6AM yesterday morning.    # Chest pain   -EKG- negative, CE negx3  - telemonitoring  - started on aspirin, c/w statin and toprol xl  -TSH, HgbA1C%, lipid panel sample collected, rx pending  -2D Echo pending  -Cardio c/s pending for Dr. Soto    # Left lower lobe opacity vs. effusion on CXR, previously read as atelectasis in CXR 2016  - no fever, cough, sputum production, no significant finding on physical exam, will hold antibiotics for now  - monitor for symptoms    # h/o Atrial fibrillation  - c/w xarelto and toprol xl    # Hypothyroidism  - c/w levothyroxine    # Hyperlipidemia  - c/w statin    # GERD  - pt takes zantac or prilosec prn at home, currently symptomatic  - will start on protonix daily for now    #) DVT ppx
84 yo F with PMHx of AFIB on Xarelto, Conduction Disorder s/p Pacemaker placement, Hypothyroidism, HLD presents with complaint of worsening left sided chest pain with intermittent radiation down left upper extremity, back, burning in character, lasting approx 30 mins, with multiple similar episodes. Unrelated to activity    ·	Intermittent L sided CP, r/o CAD v breast pain v esophageal spasm  possibly related to PPM location (L breast TTP where ppm is)  Another episode overnight - described as burning, left sided, mid axillary pain with radiation to L arm  resolved spontaneously  EKG unchanged, CE negative  TSH 3.57, Hba1c 5.5    c/w ASA, statin toprol XL  Going for Stress test today, if positive, then Cardiac Cath  Cardiology following    ·	Left lower lobe opacity vs. effusion on CXR,   previously read as atelectasis in CXR 2016  - no fever, cough, sputum production, no significant findings on physical exam  -check PA/Lat Xray for better eval  -no indication for abx at this time    ·	Atrial fibrillation  - c/w xarelto and toprol xl  ·	  ·	Hypothyroidism  - c/w levothyroxine    ·	GERD  -protonix    DVT ppx  Dispo: Lives alone with 12h/day HHA with family nearby             Pending stress test findings

## 2018-12-10 NOTE — DISCHARGE NOTE ADULT - HOSPITAL COURSE
84 yo F with PMHx of AFIB on Xarelto, Conduction Disorder s/p Pacemaker placement, Hypothyroidism, HLD presents with complaint of worsening left sided chest pain with intermittent radiation down left upper extremity, back, burning in character, lasting approx 30 mins, with multiple similar episodes. Unrelated to activity. Upon admission, EKG was unchanged. Cardiac enzymes negative x3. She had another episode overnight, resolving spontaneously. At that time, EKG and enzymes were negative as well. She subsequently underwent a stess test, which was negative. Symptoms have not recurred since. Findings were discussed with patient, her son and cardiology. She is medically stable for discharge home with outpatient follow up with PMD and cardiology. She was advised to obtain a mammogram as she has not had one in a few years. No change in current medications

## 2018-12-10 NOTE — PROGRESS NOTE ADULT - SUBJECTIVE AND OBJECTIVE BOX
Hospital Day:  2d    Subjective:  No overnight events. Patient seen and examined at bedside with attending present at time of examination. She reports that she gets some left sided chest pain that is intermittent at  times assciated with back pain and LUE pain. She states the pain is burning in nature and lasts about thirty minutes . It does not have any relationship to activity. The patient reports that she has no fevers, chills, nausea, vomiting , shortness of breath, weakness, fatigue.     Past Medical Hx:   Hyperlipidemia, unspecified hyperlipidemia type  Hypothyroidism, unspecified type  Atrial fibrillation, unspecified type    Past Sx:  H/O cardiac pacemaker    Allergies:  No Known Allergies    Current Meds:   Standng Meds:  aspirin  chewable 81 milliGRAM(s) Oral daily  atorvastatin 80 milliGRAM(s) Oral at bedtime  chlorhexidine 4% Liquid 1 Application(s) Topical <User Schedule>  docusate sodium 100 milliGRAM(s) Oral three times a day  levothyroxine 25 MICROGram(s) Oral daily  metoprolol succinate ER 25 milliGRAM(s) Oral at bedtime  pantoprazole    Tablet 40 milliGRAM(s) Oral daily  rivaroxaban 20 milliGRAM(s) Oral every 24 hours  senna 1 Tablet(s) Oral two times a day    PRN Meds:    HOME MEDICATIONS:  Crestor: 20 milligram(s) orally once a day  dicyclomine: 10 milligram(s) orally once a day, As Needed  levothyroxine 25 mcg (0.025 mg) oral tablet: 1 tab(s) orally once a day  Metoprolol Succinate ER 25 mg oral tablet, extended release: 1 tab(s) orally once a day (at bedtime)  nitroglycerin 0.4 mg/hr transdermal film, extended release:   Xarelto 20 mg oral tablet: 1 tab(s) orally once a day (in the evening)      Vital Signs:   T(F): 97.2 (12-09-18 @ 14:37), Max: 97.2 (12-09-18 @ 14:37)  HR: 65 (12-09-18 @ 14:37) (64 - 67)  BP: 127/67 (12-09-18 @ 14:37) (121/65 - 138/64)  RR: 18 (12-09-18 @ 06:21) (18 - 18)  SpO2: --        Physical Exam:   GENERAL: NAD, Sitting in bed, conversive  HEENT: NCAT, PERRLA, EOMI  CHEST/LUNG: CTA, No w/r/r. Anterior chest wall tenderness reproducible when palpating pacemaker site   HEART: Regular rate and rhythm; s1 s2 appreciated, Grade 2/6 systolic murmur appreciated in Left upper sternal border   ABDOMEN: Soft, Nontender, Nondistended; Bowel sounds present  EXTREMITIES: Mild LE edema, Peripheral pulses 2+, No clubbing, no cyanosis  NERVOUS SYSTEM:  Alert & Oriented X3, Non focal     Labs:                         13.0   6.20  )-----------( 180      ( 08 Dec 2018 07:33 )             38.8       08 Dec 2018 07:33    142    |  104    |  17     ----------------------------<  91     4.0     |  22     |  0.9      Ca    8.9        08 Dec 2018 07:33  Mg     2.1       08 Dec 2018 07:33    Hemoglobin A1C, Whole Blood: 5.5 % (12-08-18 @ 07:33)    Serum Pro-Brain Natriuretic Peptide: 207 pg/mL (12-07-18 @ 11:23)    Troponin <0.01, CKMB 2.0, CK 88 12-08-18 @ 07:33  Troponin <0.01, CKMB 1.6, CK 73 12-07-18 @ 21:49  Troponin <0.01, CKMB --, CK -- 12-07-18 @ 11:37  Troponin <0.01, CKMB --, CK -- 12-07-18 @ 11:23    Radiology:   TTE pending read    Assessment and Plan:   This is an 83 year old female with PMHx of Atrial fibrillation on Xarelto, Conduction disorder s/p pacemaker, hypothyroidism, HLD who presenting with worsening left sided chest pain with intermittent radiation of the pain to the back and/or LUE that is burning and lasting 30 minutes.     #Chest pain: atypical   Ddx: PPM, ACS, costochondritis  - Follows with Dr. Soto   - WALESKA (-) x 3  - Cardiology consulted; pending attending evaluation  - TTE performed; read pending   - EKG negative   - Telemetry with no events     #Left lower lobe opacity vs Effusion noted on CXR  - Currently asymptomatic. No indication for any antibiotics     #Atrial Fibrillation  - Continue on Xarelto and Toprol XL    #Hypothyroidism  - Continue on Levothyroxine     #GERD  - Protonix    Activity: As tolerated  Diet: DASH  DVT ppx: On xarelto  GI ppx: On protonix  Code Status: Full Code; introduced [facility]  Dispo: Anticipate DC tomorrow pending Echo and Cardiology
GEO DOHERTY  83y Female    CHIEF COMPLAINT:    Patient is a 83y old  Female who presents with a chief complaint of chest pain (10 Dec 2018 09:46)      INTERVAL HPI/OVERNIGHT EVENTS:    Patient seen and examined. Had episode of left sided mid axillary burning pain, radiating to arm, resolved gradually and spontaneously. Similar to prior occurrences     ROS: All other systems are negative.    Vital Signs:    T(F): 96.3 (12-10-18 @ 05:03), Max: 97.3 (12-09-18 @ 20:08)  HR: 71 (12-10-18 @ 05:03) (65 - 71)  BP: 153/97 (12-10-18 @ 05:03) (122/72 - 153/97)  RR: 18 (12-10-18 @ 05:03) (18 - 18)  SpO2: --  I&O's Summary    PHYSICAL EXAM:    GENERAL:  NAD, sitting up in bed  SKIN: No rashes or lesions  HEENT: Atraumatic. Normocephalic.   NECK: Supple, No JVD. No lymphadenopathy.  PULMONARY: CTA B/L. No wheezing. No rales  CVS: Normal S1, S2. Rate and Rhythm are regular. No murmurs.  ABDOMEN/GI: Soft, Nontender, Nondistended; BS present  MSK:  No edema B/L LE. L breast TTP around ppm site  NEUROLOGIC:  No motor or sensory deficit.  PSYCH: Alert & oriented x 3, normal affect    Consultant(s) Notes Reviewed:  [x ] YES  [ ] NO  Care Discussed with Consultants/Other Providers [ x] YES  [ ] NO    LABS  Serum Pro-Brain Natriuretic Peptide: 207 pg/mL (12-07-18 @ 11:23)    Trop <0.01, CKMB 1.8, CK 82, 12-10-18 @ 05:18  Trop <0.01, CKMB 2.0, CK 88, 12-08-18 @ 07:33  Trop <0.01, CKMB 1.6, CK 73, 12-07-18 @ 21:49  Trop <0.01, CKMB --, CK --, 12-07-18 @ 11:37        RADIOLOGY & ADDITIONAL TESTS:      Imaging or report Personally Reviewed:  [x] YES  [ ] NO  EKG reviewed: [x] YES  [ ] NO    Medications:  Standing  adenosine Injectable (ADENOSCAN) 60 milliGRAM(s) IV Bolus once  aspirin  chewable 81 milliGRAM(s) Oral daily  atorvastatin 80 milliGRAM(s) Oral at bedtime  chlorhexidine 4% Liquid 1 Application(s) Topical <User Schedule>  docusate sodium 100 milliGRAM(s) Oral three times a day  levothyroxine 25 MICROGram(s) Oral daily  metoprolol succinate ER 25 milliGRAM(s) Oral at bedtime  pantoprazole    Tablet 40 milliGRAM(s) Oral daily  rivaroxaban 20 milliGRAM(s) Oral every 24 hours  senna 1 Tablet(s) Oral two times a day    PRN Meds
SUBJECTIVE:    Patient is a 83y old Female who presents with a chief complaint of chest pain (07 Dec 2018 16:11)    Currently admitted to medicine with the primary diagnosis of Chest pain     Today is hospital day 1d. This morning she is resting comfortably in bed and reports no new issues or overnight events.     PAST MEDICAL & SURGICAL HISTORY  Hyperlipidemia, unspecified hyperlipidemia type  Hypothyroidism, unspecified type  Atrial fibrillation, unspecified type  H/O cardiac pacemaker    SOCIAL HISTORY:  Negative for smoking/alcohol/drug use.     ALLERGIES:  No Known Allergies    MEDICATIONS:  STANDING MEDICATIONS  aspirin  chewable 81 milliGRAM(s) Oral daily  atorvastatin 80 milliGRAM(s) Oral at bedtime  chlorhexidine 4% Liquid 1 Application(s) Topical <User Schedule>  docusate sodium 100 milliGRAM(s) Oral three times a day  levothyroxine 25 MICROGram(s) Oral daily  metoprolol succinate ER 25 milliGRAM(s) Oral at bedtime  pantoprazole    Tablet 40 milliGRAM(s) Oral daily  rivaroxaban 20 milliGRAM(s) Oral every 24 hours  senna 1 Tablet(s) Oral two times a day    PRN MEDICATIONS    VITALS:   T(F): 97.3  HR: 60  BP: 122/63  RR: 18  SpO2: 98%    LABS:                        13.0   5.91  )-----------( 196      ( 07 Dec 2018 11:37 )             38.8     12-07    141  |  103  |  15  ----------------------------<  91  5.0   |  24  |  0.9    Ca    9.1      07 Dec 2018 11:23  Phos  3.5     12-07  Mg     2.3     12-07    TPro  6.9  /  Alb  3.9  /  TBili  0.5  /  DBili  x   /  AST  22  /  ALT  10  /  AlkPhos  53  12-07    PT/INR - ( 07 Dec 2018 11:23 )   PT: 15.40 sec;   INR: 1.34 ratio         PTT - ( 07 Dec 2018 11:23 )  PTT:36.3 sec      Creatine Kinase, Serum: 73 U/L (12-07-18 @ 21:49)  Troponin T, Serum: <0.01 ng/mL (12-07-18 @ 21:49)  Troponin T, Serum: <0.01 ng/mL (12-07-18 @ 11:37)  Troponin T, Serum: <0.01 ng/mL (12-07-18 @ 11:23)      CARDIAC MARKERS ( 07 Dec 2018 21:49 )  x     / <0.01 ng/mL / 73 U/L / x     / 1.6 ng/mL  CARDIAC MARKERS ( 07 Dec 2018 11:37 )  x     / <0.01 ng/mL / x     / x     / x      CARDIAC MARKERS ( 07 Dec 2018 11:23 )  x     / <0.01 ng/mL / x     / x     / x          RADIOLOGY:    PHYSICAL EXAM:  GEN: No acute distress  LUNGS: Clear to auscultation bilaterally   HEART: S1/S2 present. RRR.   ABD: Soft, non-tender, non-distended. Bowel sounds present  EXT: NC/NC/NE/2+PP/WYATT  NEURO: AAOX3
SUBJECTIVE:    Patient is a 83y old Female who presents with a chief complaint of chest pain (09 Dec 2018 17:28)    Currently admitted to medicine with the primary diagnosis of Chest pain     Today is hospital day 3d. This morning she is resting comfortably in bed and reports no new issues or overnight events.     PAST MEDICAL & SURGICAL HISTORY  Hyperlipidemia, unspecified hyperlipidemia type  Hypothyroidism, unspecified type  Atrial fibrillation, unspecified type  H/O cardiac pacemaker    SOCIAL HISTORY:  Negative for smoking/alcohol/drug use.     ALLERGIES:  No Known Allergies    MEDICATIONS:  STANDING MEDICATIONS  aspirin  chewable 81 milliGRAM(s) Oral daily  atorvastatin 80 milliGRAM(s) Oral at bedtime  chlorhexidine 4% Liquid 1 Application(s) Topical <User Schedule>  docusate sodium 100 milliGRAM(s) Oral three times a day  levothyroxine 25 MICROGram(s) Oral daily  metoprolol succinate ER 25 milliGRAM(s) Oral at bedtime  pantoprazole    Tablet 40 milliGRAM(s) Oral daily  rivaroxaban 20 milliGRAM(s) Oral every 24 hours  senna 1 Tablet(s) Oral two times a day    PRN MEDICATIONS    VITALS:   T(F): 96.3  HR: 71  BP: 153/97  RR: 18  SpO2: --    LABS:                Creatine Kinase, Serum: 82 U/L (12-10-18 @ 05:18)  Troponin T, Serum: <0.01 ng/mL (12-10-18 @ 05:18)      CARDIAC MARKERS ( 10 Dec 2018 05:18 )  x     / <0.01 ng/mL / 82 U/L / x     / 1.8 ng/mL      RADIOLOGY:    PHYSICAL EXAM:  GEN: No acute distress  LUNGS: Clear to auscultation bilaterally   HEART: S1/S2 present. RRR.   ABD: Soft, non-tender, non-distended. Bowel sounds present  EXT: NC/NC/NE/2+PP/WYATT  NEURO: AAOX3

## 2018-12-10 NOTE — DISCHARGE NOTE ADULT - OTHER SIGNIFICANT FINDINGS
Adenosine stress test 12/10:   Impression:     1. NORMAL ADENOSINE / REST MYOCARDIAL PERFUSION TOMOGRAPHY, WITH NO   EVIDENCE FOR ISCHEMIA DURING ADENOSINE INFUSION.   2. NORMAL RESTING LEFT VENTRICULAR WALL MOTION AND WALL THICKENING.  3. LEFT VENTRICULAR EJECTION FRACTION OF  more than 75%WITHIN RANGE OF   NORMAL. NO SIGNIFICANT INTERVAL CHANGE AS COMPARED TO PRIOR EXAMINATION.

## 2018-12-13 DIAGNOSIS — R07.9 CHEST PAIN, UNSPECIFIED: ICD-10-CM

## 2018-12-13 DIAGNOSIS — E78.5 HYPERLIPIDEMIA, UNSPECIFIED: ICD-10-CM

## 2018-12-13 DIAGNOSIS — I48.91 UNSPECIFIED ATRIAL FIBRILLATION: ICD-10-CM

## 2018-12-13 DIAGNOSIS — Z95.0 PRESENCE OF CARDIAC PACEMAKER: ICD-10-CM

## 2018-12-13 DIAGNOSIS — E03.9 HYPOTHYROIDISM, UNSPECIFIED: ICD-10-CM

## 2018-12-13 DIAGNOSIS — K21.9 GASTRO-ESOPHAGEAL REFLUX DISEASE WITHOUT ESOPHAGITIS: ICD-10-CM

## 2019-04-12 PROBLEM — E03.9 HYPOTHYROIDISM, UNSPECIFIED: Chronic | Status: ACTIVE | Noted: 2018-12-07

## 2019-04-12 PROBLEM — I48.91 UNSPECIFIED ATRIAL FIBRILLATION: Chronic | Status: ACTIVE | Noted: 2018-12-07

## 2019-04-12 PROBLEM — E78.5 HYPERLIPIDEMIA, UNSPECIFIED: Chronic | Status: ACTIVE | Noted: 2018-12-07

## 2019-10-14 ENCOUNTER — APPOINTMENT (OUTPATIENT)
Dept: CARDIOLOGY | Facility: CLINIC | Age: 84
End: 2019-10-14

## 2020-06-17 ENCOUNTER — APPOINTMENT (OUTPATIENT)
Dept: CARDIOLOGY | Facility: CLINIC | Age: 85
End: 2020-06-17
Payer: MEDICARE

## 2020-06-17 PROCEDURE — 99213 OFFICE O/P EST LOW 20 MIN: CPT | Mod: 25

## 2020-06-17 PROCEDURE — 93280 PM DEVICE PROGR EVAL DUAL: CPT | Mod: 59

## 2020-07-22 ENCOUNTER — EMERGENCY (EMERGENCY)
Facility: HOSPITAL | Age: 85
LOS: 0 days | Discharge: HOME | End: 2020-07-23
Attending: EMERGENCY MEDICINE | Admitting: EMERGENCY MEDICINE
Payer: MEDICARE

## 2020-07-22 VITALS
WEIGHT: 166.89 LBS | SYSTOLIC BLOOD PRESSURE: 149 MMHG | RESPIRATION RATE: 18 BRPM | OXYGEN SATURATION: 97 % | TEMPERATURE: 98 F | HEART RATE: 72 BPM | DIASTOLIC BLOOD PRESSURE: 63 MMHG

## 2020-07-22 DIAGNOSIS — Z79.01 LONG TERM (CURRENT) USE OF ANTICOAGULANTS: ICD-10-CM

## 2020-07-22 DIAGNOSIS — E78.5 HYPERLIPIDEMIA, UNSPECIFIED: ICD-10-CM

## 2020-07-22 DIAGNOSIS — R07.89 OTHER CHEST PAIN: ICD-10-CM

## 2020-07-22 DIAGNOSIS — I48.91 UNSPECIFIED ATRIAL FIBRILLATION: ICD-10-CM

## 2020-07-22 DIAGNOSIS — R00.2 PALPITATIONS: ICD-10-CM

## 2020-07-22 DIAGNOSIS — E03.9 HYPOTHYROIDISM, UNSPECIFIED: ICD-10-CM

## 2020-07-22 DIAGNOSIS — R55 SYNCOPE AND COLLAPSE: ICD-10-CM

## 2020-07-22 DIAGNOSIS — Z95.0 PRESENCE OF CARDIAC PACEMAKER: ICD-10-CM

## 2020-07-22 DIAGNOSIS — R53.81 OTHER MALAISE: ICD-10-CM

## 2020-07-22 DIAGNOSIS — I45.9 CONDUCTION DISORDER, UNSPECIFIED: ICD-10-CM

## 2020-07-22 DIAGNOSIS — Z95.0 PRESENCE OF CARDIAC PACEMAKER: Chronic | ICD-10-CM

## 2020-07-22 DIAGNOSIS — R25.1 TREMOR, UNSPECIFIED: ICD-10-CM

## 2020-07-22 LAB
ALBUMIN SERPL ELPH-MCNC: 4.1 G/DL — SIGNIFICANT CHANGE UP (ref 3.5–5.2)
ALP SERPL-CCNC: 51 U/L — SIGNIFICANT CHANGE UP (ref 30–115)
ALT FLD-CCNC: 10 U/L — SIGNIFICANT CHANGE UP (ref 0–41)
ANION GAP SERPL CALC-SCNC: 14 MMOL/L — SIGNIFICANT CHANGE UP (ref 7–14)
AST SERPL-CCNC: 22 U/L — SIGNIFICANT CHANGE UP (ref 0–41)
BASOPHILS # BLD AUTO: 0.02 K/UL — SIGNIFICANT CHANGE UP (ref 0–0.2)
BASOPHILS NFR BLD AUTO: 0.4 % — SIGNIFICANT CHANGE UP (ref 0–1)
BILIRUB SERPL-MCNC: 0.2 MG/DL — SIGNIFICANT CHANGE UP (ref 0.2–1.2)
BUN SERPL-MCNC: 13 MG/DL — SIGNIFICANT CHANGE UP (ref 10–20)
CALCIUM SERPL-MCNC: 9.1 MG/DL — SIGNIFICANT CHANGE UP (ref 8.5–10.1)
CHLORIDE SERPL-SCNC: 103 MMOL/L — SIGNIFICANT CHANGE UP (ref 98–110)
CO2 SERPL-SCNC: 21 MMOL/L — SIGNIFICANT CHANGE UP (ref 17–32)
CREAT SERPL-MCNC: 0.9 MG/DL — SIGNIFICANT CHANGE UP (ref 0.7–1.5)
EOSINOPHIL # BLD AUTO: 0.08 K/UL — SIGNIFICANT CHANGE UP (ref 0–0.7)
EOSINOPHIL NFR BLD AUTO: 1.5 % — SIGNIFICANT CHANGE UP (ref 0–8)
GLUCOSE SERPL-MCNC: 127 MG/DL — HIGH (ref 70–99)
HCT VFR BLD CALC: 38.8 % — SIGNIFICANT CHANGE UP (ref 37–47)
HGB BLD-MCNC: 12.6 G/DL — SIGNIFICANT CHANGE UP (ref 12–16)
IMM GRANULOCYTES NFR BLD AUTO: 0.4 % — HIGH (ref 0.1–0.3)
LYMPHOCYTES # BLD AUTO: 1.18 K/UL — LOW (ref 1.2–3.4)
LYMPHOCYTES # BLD AUTO: 22.1 % — SIGNIFICANT CHANGE UP (ref 20.5–51.1)
MAGNESIUM SERPL-MCNC: 2.1 MG/DL — SIGNIFICANT CHANGE UP (ref 1.8–2.4)
MCHC RBC-ENTMCNC: 28.5 PG — SIGNIFICANT CHANGE UP (ref 27–31)
MCHC RBC-ENTMCNC: 32.5 G/DL — SIGNIFICANT CHANGE UP (ref 32–37)
MCV RBC AUTO: 87.8 FL — SIGNIFICANT CHANGE UP (ref 81–99)
MONOCYTES # BLD AUTO: 0.41 K/UL — SIGNIFICANT CHANGE UP (ref 0.1–0.6)
MONOCYTES NFR BLD AUTO: 7.7 % — SIGNIFICANT CHANGE UP (ref 1.7–9.3)
NEUTROPHILS # BLD AUTO: 3.64 K/UL — SIGNIFICANT CHANGE UP (ref 1.4–6.5)
NEUTROPHILS NFR BLD AUTO: 67.9 % — SIGNIFICANT CHANGE UP (ref 42.2–75.2)
NRBC # BLD: 0 /100 WBCS — SIGNIFICANT CHANGE UP (ref 0–0)
NT-PROBNP SERPL-SCNC: 216 PG/ML — SIGNIFICANT CHANGE UP (ref 0–300)
PLATELET # BLD AUTO: 203 K/UL — SIGNIFICANT CHANGE UP (ref 130–400)
POTASSIUM SERPL-MCNC: 4.3 MMOL/L — SIGNIFICANT CHANGE UP (ref 3.5–5)
POTASSIUM SERPL-SCNC: 4.3 MMOL/L — SIGNIFICANT CHANGE UP (ref 3.5–5)
PROT SERPL-MCNC: 7.1 G/DL — SIGNIFICANT CHANGE UP (ref 6–8)
RBC # BLD: 4.42 M/UL — SIGNIFICANT CHANGE UP (ref 4.2–5.4)
RBC # FLD: 13.9 % — SIGNIFICANT CHANGE UP (ref 11.5–14.5)
SODIUM SERPL-SCNC: 138 MMOL/L — SIGNIFICANT CHANGE UP (ref 135–146)
TROPONIN T SERPL-MCNC: <0.01 NG/ML — SIGNIFICANT CHANGE UP
TROPONIN T SERPL-MCNC: <0.01 NG/ML — SIGNIFICANT CHANGE UP
WBC # BLD: 5.35 K/UL — SIGNIFICANT CHANGE UP (ref 4.8–10.8)
WBC # FLD AUTO: 5.35 K/UL — SIGNIFICANT CHANGE UP (ref 4.8–10.8)

## 2020-07-22 PROCEDURE — 99220: CPT

## 2020-07-22 PROCEDURE — 99214 OFFICE O/P EST MOD 30 MIN: CPT

## 2020-07-22 PROCEDURE — 71045 X-RAY EXAM CHEST 1 VIEW: CPT | Mod: 26

## 2020-07-22 PROCEDURE — 93010 ELECTROCARDIOGRAM REPORT: CPT | Mod: 76

## 2020-07-22 PROCEDURE — 93280 PM DEVICE PROGR EVAL DUAL: CPT | Mod: 26

## 2020-07-22 PROCEDURE — 93010 ELECTROCARDIOGRAM REPORT: CPT

## 2020-07-22 RX ORDER — RIVAROXABAN 15 MG-20MG
1 KIT ORAL
Qty: 0 | Refills: 0 | DISCHARGE

## 2020-07-22 RX ORDER — LEVOTHYROXINE SODIUM 125 MCG
1 TABLET ORAL
Qty: 0 | Refills: 0 | DISCHARGE

## 2020-07-22 RX ORDER — ROSUVASTATIN CALCIUM 5 MG/1
20 TABLET ORAL
Qty: 0 | Refills: 0 | DISCHARGE

## 2020-07-22 RX ORDER — PANTOPRAZOLE SODIUM 20 MG/1
40 TABLET, DELAYED RELEASE ORAL
Refills: 0 | Status: DISCONTINUED | OUTPATIENT
Start: 2020-07-22 | End: 2020-07-22

## 2020-07-22 RX ORDER — RIVAROXABAN 15 MG-20MG
20 KIT ORAL
Refills: 0 | Status: DISCONTINUED | OUTPATIENT
Start: 2020-07-22 | End: 2020-07-22

## 2020-07-22 RX ORDER — METOPROLOL TARTRATE 50 MG
1 TABLET ORAL
Qty: 0 | Refills: 0 | DISCHARGE

## 2020-07-22 RX ORDER — NITROGLYCERIN 6.5 MG
0 CAPSULE, EXTENDED RELEASE ORAL
Qty: 0 | Refills: 0 | DISCHARGE

## 2020-07-22 RX ORDER — METOPROLOL TARTRATE 50 MG
25 TABLET ORAL DAILY
Refills: 0 | Status: DISCONTINUED | OUTPATIENT
Start: 2020-07-22 | End: 2020-07-22

## 2020-07-22 RX ORDER — LEVOTHYROXINE SODIUM 125 MCG
25 TABLET ORAL DAILY
Refills: 0 | Status: DISCONTINUED | OUTPATIENT
Start: 2020-07-22 | End: 2020-07-22

## 2020-07-22 RX ADMIN — RIVAROXABAN 20 MILLIGRAM(S): KIT at 23:49

## 2020-07-22 NOTE — ED CDU PROVIDER INITIAL DAY NOTE - PROGRESS NOTE DETAILS
s/o note:  received s/o from KAREL Lucas, evaluated patient at bedside. Denies any Chest pain/ palpitations and sob/ dizziness/lightheadedness currently. patient reports feeling much better. PE: NAD. VSS. s1 s2 no murmur, abd soft/ND/NT/ no swelling and tenderness to extremities. pulses are equal to all extremities. Neuro exam grossly unremarkable. speaking coherently and clear speech. pending echo and EP evaluation in am.

## 2020-07-22 NOTE — ED PROVIDER NOTE - ATTENDING CONTRIBUTION TO CARE
85 yo f with pmh of afib on xarelto, hypothyroidism, ppm 2018, presents with c/o palpitations and near syncope.  pt says started 4 days ago, feels weakness with palpitations and thought she would faint.  pt also c/o chest pain/pressure that is intermittent.  tried NTG patch without improvement.  pt denies headache, no dizziness, no n/v/d, no abd pain, no leg pain.  pt admits has chronic mild leg swelling.  exam: nad, ncat, perrl, eomi, mmm, rrr, ctab, abd soft, nt,nd aox3, +1 pitting edema b/l imp: pt with ppm, palpiations with near syncope, pt is 2mn pt, will check cardiacs, ekg, cxr, ppm interrogation, likely obs candidate

## 2020-07-22 NOTE — ED PROVIDER NOTE - CLINICAL SUMMARY MEDICAL DECISION MAKING FREE TEXT BOX
pt with ppm, palpiations with near syncope, pt is 2mn pt, will check cardiacs, ekg, cxr, ppm interrogation, likely obs candidate

## 2020-07-22 NOTE — ED CDU PROVIDER INITIAL DAY NOTE - OBJECTIVE STATEMENT
85 y/o female with PMHX of A. Joanne on Xarelto, Sinus Arrest s/p PPM, HLD, Hypothyroidism, Positive Cardiac Family History,  presenting under two midnight protocol. As per pt, she came to the ED today because she was having multiple episodes of  near syncope at home. Pt states "felt like my heart was stopping", which lasted approx 1 minute associated with flushing sensation throughout her body, generalized fatigue/weakness and mild chest pain afterwards. Cardiologist - Dr Soto. Symptoms have been occuring over the last few days.

## 2020-07-22 NOTE — ED PROVIDER NOTE - PHYSICAL EXAMINATION
GENERAL: NAD, well-developed Turkmen speaking F comfortable in bed  HEENT:  Atraumatic, Normocephalic; EOMI, PERRLA, conjunctiva pink, sclera white, Supple, No JVD or thyromegally on palpation of neck  CHEST/LUNG: Clear to auscultation bilaterally; No wheeze  HEART: Regular rate ~75 on palpation and rhythm; No murmurs appreciated  ABDOMEN: Soft, Nontender, Nondistended; Bowel sounds present  EXTREMITIES:  2+ Peripheral Pulses, No peripheral pitting edema  PSYCH: AAOx3  NEUROLOGY: non-focal but mild hand tremor noted  SKIN: No rashes/lesions appreciated

## 2020-07-22 NOTE — ED PROVIDER NOTE - PMH
Atrial fibrillation, unspecified type    Conduction disorder of the heart    Hyperlipidemia, unspecified hyperlipidemia type    Hypothyroidism, unspecified type

## 2020-07-22 NOTE — ED PROVIDER NOTE - OBJECTIVE STATEMENT
85y/o F with PMG of Afib on xarelto, hx of conduction disorder s/p PPM (2018), hypothyroidism presents with near syncopal events. Started 3-4d ago with undefined malaise and palpitations described "I thought my heart would stop", feeling irregular. A/w flushed feeling, nausea, mild CP at top of sternum w/ no substernal. Nothing triggers it in particular, can happen any time, 3-4X/day, lasting 1-2min. She sits and it improves. Tried 0.1 NG transdermal patch w/o improvement. Day of presentation she felt worsening malaise so came to ED. No SOB, dizziness/vomiting, fever/cough, OBANDO, constipation/diarrhea, inc in leg swelling/weight. No recent changes in meds.

## 2020-07-22 NOTE — ED CDU PROVIDER INITIAL DAY NOTE - MEDICAL DECISION MAKING DETAILS
83yo woman h/o afib on xarelto, sinus arrest s/p PPM, HLD, hypothyroid was placed in CDU for workup for near syncope with palpitations at home. Sx usually last about 1 minute, pt feels discomfort, but no lindy chest pain. Pt follows with Dr Soto and Dr Dugan. ED workup with EKG, labs was unremarkable. Pt was monitored without incident in CDU; repeat EKG and enzymes unchanged. She was seen by Dr Dugan and PPM was interrogated; there were no events. Rec d/c metoprolol and follow up for outpt tilt-table test. Cardiology also evaluated,

## 2020-07-22 NOTE — ED CDU PROVIDER INITIAL DAY NOTE - ATTENDING CONTRIBUTION TO CARE
83yo woman h/o afib on xarelto, sinus arrest s/p PPM, HLD, hypothyroid was placed in CDU for workup for near syncope with palpitations at home. Sx usually last about 1 minute, pt feels discomfort, but no lindy chest pain. Pt follows with Dr Soto and Dr Dugan. ED workup with EKG, labs was unremarkable. Plan is for telemetry, serial EKG/enzymes, EP/cardiology consult, reassess.

## 2020-07-23 VITALS — SYSTOLIC BLOOD PRESSURE: 113 MMHG | DIASTOLIC BLOOD PRESSURE: 57 MMHG | HEART RATE: 70 BPM | TEMPERATURE: 96 F

## 2020-07-23 PROCEDURE — 99217: CPT

## 2020-07-23 PROCEDURE — 93010 ELECTROCARDIOGRAM REPORT: CPT

## 2020-07-23 RX ADMIN — Medication 25 MICROGRAM(S): at 05:12

## 2020-07-23 RX ADMIN — Medication 25 MILLIGRAM(S): at 05:54

## 2020-07-23 RX ADMIN — PANTOPRAZOLE SODIUM 40 MILLIGRAM(S): 20 TABLET, DELAYED RELEASE ORAL at 05:12

## 2020-07-23 NOTE — CONSULT NOTE ADULT - ASSESSMENT
84y Female with h/o PAF on Xarelto, h/o sinus arrest 2014, DC PPM ([employer]) placed at that time, hypothyroidism, HLD, recently diagnosed Parkinson disease, presented to ED c/o recurrent episodes of vague "weakness"  Device interrogated and Device interrogated and reprogrammed   Stop metoprolol  check TSH  Echocardiogram  F/u with Dr Soto  recommend out-pt tilt table test 84y Female with h/o PAF on Xarelto, h/o sinus arrest 2014, DC PPM ([employer]) placed at that time, hypothyroidism, HLD, recently diagnosed Parkinson disease, presented to ED c/o recurrent episodes of vague "weakness"  Device interrogated  showed A pacing near 998-100%  Rare Vpacing No Atrial or Ventricular Arrhythmias mild increase V lead thresholds  Impression  Vague symptoms of weakness could be  due to  hypotension caused by Parkinson  disease aggrevated  by  B blockers and  nitroglycerine  patches    Recmmend   Reprogrammed PPM TO mvp with Rate  Modulation see print out  Tilt  Table test  hold metoprolol  check TSH  Echocardiogram  F/u with Dr Soto

## 2020-07-23 NOTE — CONSULT NOTE ADULT - ASSESSMENT
Assessment:  Palpitations  PPM placed 2014   h/o Afib/Atach    Plan: Assessment:  Palpitations  PPM placed 2014 for sinus arrest or asystole  h/o Afib/Atach on xarelto, currently remained in sinus  ACS ruled out  No prior CAD, No angina symptoms  No events noted on telemetry  Switched from lisinopril 10 mg to amlodipine 2.5 mg last clinic visit in 6/17/2020    Plan:  check orthostatics  confirmed with EP, PPM adjusted to MVP (Managed Ventricular Pacing) mode with rate-response for demand  c/w xarelto  recommend hydration and discontinue amlodipine if orthostatic positive  outpatient follow up Assessment:  Palpitations  PPM placed 2014 for sinus arrest or asystole  h/o Afib/Atach on xarelto, currently remained in sinus  ACS ruled out this presentation, no prior CAD, No angina symptoms  No events noted on telemetry  Switched from lisinopril 10 mg to amlodipine 2.5 mg last clinic visit in 6/17/2020  Bedside orthostatic negative, supine 130,64, sitting 124/68, standing 137/74, HR paced at 60    Plan:  noted V-pacing episodes associated with symptoms onset, however, confirmed with EP, PPM adjusted to MVP (Managed Ventricular Pacing) mode with rate-response for demand  c/w xarelto  outpatient follow up with Dr. Soto and EP Dr. Dugan    Information obtained using [employer] #[id number]

## 2020-07-23 NOTE — CONSULT NOTE ADULT - ATTENDING COMMENTS
I was physically present for the key portions of the evaluation and management (E/M) service provided.  I agree with the above history, physical, and plan which I have reviewed and edited where appropriate.  This was reviewed with the cardiology fellow/medical resident (Dr. Burk).

## 2020-07-23 NOTE — ED ADULT NURSE REASSESSMENT NOTE - NS ED NURSE REASSESS COMMENT FT1
ID #[id number]. Pt A+Ox4. Pt states she has left side chest pain that radiates to the back but feels much better than she did before. Pt states when the episode happened she felt like her heart was stopping and got very warm and flushed. New IV placed, left hand 18G. Pt awaiting echo.

## 2020-07-23 NOTE — ED CDU PROVIDER DISPOSITION NOTE - PATIENT PORTAL LINK FT
You can access the FollowMyHealth Patient Portal offered by [facility] by registering at the following website: [email or web address]. By joining [email or web address]’s FollowMyHealth portal, you will also be able to view your health information using other applications (apps) compatible with our system.

## 2020-07-23 NOTE — ED CDU PROVIDER SUBSEQUENT DAY NOTE - MEDICAL DECISION MAKING DETAILS
85yo woman h/o afib on xarelto, sinus arrest s/p PPM, HLD, hypothyroid was placed in CDU for workup for near syncope with palpitations at home. Sx usually last about 1 minute, pt feels discomfort, but no lindy chest pain. Pt follows with Dr Soto and Dr Dugan. ED workup with EKG, labs was unremarkable. Pt was monitored without incident in CDU; repeat EKG and enzymes unchanged. She was seen by Dr Dugan and PPM was interrogated; there were no events. PPM was adjusted to MVP (Managed Ventricular Pacing). Rec d/c metoprolol and follow up for outpt tilt-table test. Cardiology also evaluated, no need for further ACS workup for now but rec prompt f/u with Dr Soto and Dr Dugan. Pt still with occasional sx but without rhythm changes on the monitor or PPM. She will f/u with Dr Beheit for tilt table and return for worsening sx.

## 2020-07-23 NOTE — ED CDU PROVIDER DISPOSITION NOTE - NSFOLLOWUPINSTRUCTIONS_ED_ALL_ED_FT
follow up Dr. allen     Do tilt table test out patient / stop metoprolol as reccomended by cardio     Syncope    Syncope is when you temporarily lose consciousness, also called fainting or passing out. It is caused by a sudden decrease in blood flow to the brain. Even though most causes of syncope are not dangerous, syncope can possibly be a sign of a serious medical problem. Signs that you may be about to faint include feeling dizzy, lightheaded, nausea, visual changes, or cold/clammy skin. Do not drive, operate heavy machinery, or play sports until your health care provider says it is okay.    SEEK IMMEDIATE MEDICAL CARE IF YOU HAVE ANY OF THE FOLLOWING SYMPTOMS: severe headache, pain in your chest/abdomen/back, bleeding from your mouth or rectum, palpitations, shortness of breath, pain with breathing, seizure, confusion, or trouble walking.

## 2020-07-23 NOTE — ED CDU PROVIDER DISPOSITION NOTE - CARE PROVIDER_API CALL
James Soto  CARDIOLOGY  [address] [place] [address]  Phone: [phone]  Fax: [phone]  Follow Up Time:

## 2020-07-23 NOTE — CONSULT NOTE ADULT - SUBJECTIVE AND OBJECTIVE BOX
Date of Admission: 07-22-20    CHIEF COMPLAINT: Patient is a 84y old  Female who presents with a chief complaint of near syncopal events.    HPI: 85y/o F with PMG of Afib on xarelto, hx of conduction disorder s/p PPM (2018), hypothyroidism presents with near syncopal events. Started 3-4d ago with undefined malaise and palpitations described "I thought my heart would stop", feeling irregular. A/w flushed feeling, nausea, mild CP at top of sternum w/ no substernal. Nothing triggers it in particular, can happen any time, 3-4X/day, lasting 1-2min. She sits and it improves. Tried 0.1 NG transdermal patch w/o improvement. Day of presentation she felt worsening malaise so came to ED. No SOB, dizziness/vomiting, fever/cough, OBANDO, constipation/diarrhea, inc in leg swelling/weight. No recent changes in meds.      PAST MEDICAL & SURGICAL HISTORY:  Conduction disorder of the heart  Hyperlipidemia, unspecified hyperlipidemia type  Hypothyroidism, unspecified type  Atrial fibrillation, unspecified type  H/O cardiac pacemaker      FAMILY HISTORY:  Family history of early CAD (Mother)  Family history of breast cancer (Sibling)    SOCIAL HISTORY:    [x] Non-smoker  [x] No alcohol use  [x] No illicit drug use    Allergies    No Known Allergies    Intolerances    	    REVIEW OF SYSTEMS:  CONSTITUTIONAL: No fever, weight loss, or fatigue. (+) Malaise and flushing.  CARDIOLOGY: (+) Palpitations. No dyspnea of exertion, or syncopal episodes.   RESPIRATORY: No shortness of breath, cough, wheezing.   NEUROLOGICAL: No weakness, no focal deficits to report.  GI: No BRBPR, no N,V,diarrhea.    PSYCHIATRY: Normal mood and affect.  HEENT: No nasal discharge, no ecchymosis  SKIN: No ecchymosis, no breakdown  MUSCULOSKELETAL: Full range of motion x4.   EXTREM: No leg swelling or erythema.    PHYSICAL EXAM:  T(C): 36.1 (07-23-20 @ 07:19), Max: 36.9 (07-22-20 @ 12:57)  HR: 84 (07-23-20 @ 07:19) (60 - 84)  BP: 120/69 (07-23-20 @ 07:19) (115/57 - 149/63)  RR: 18 (07-23-20 @ 07:19) (15 - 18)  SpO2: 99% (07-23-20 @ 07:19) (97% - 99%)  Wt(kg): --  I&O's Summary      General Appearance: NAD, normal for age and gender. 	  Neck: Normal JVP, no bruit.   Eyes: No xanthomalasia, Extra Ocular muscles intact.   Cardiovascular: Regular rate and rhythm S1 S2, No JVD, No murmurs.  Respiratory: Lungs clear to auscultation. No wheezes, rales or rhonchi.  Psychiatry: Alert and oriented x 3, Mood & affect appropriate  Gastrointestinal:  Soft, Non-tender  Skin/Integumen: No rashes, No ecchymoses, No cyanosis	  Neurologic: Non-focal deficits.  Musculoskeletal/ extremities: Normal range of motion, No clubbing, cyanosis or edema  Vascular: Peripheral pulses palpable bilaterally    LABS:	 	                        12.6   5.35  )-----------( 203      ( 22 Jul 2020 13:45 )             38.8     07-22    138  |  103  |  13  ----------------------------<  127<H>  4.3   |  21  |  0.9    Ca    9.1      22 Jul 2020 13:45  Mg     2.1     07-22    TPro  7.1  /  Alb  4.1  /  TBili  0.2  /  DBili  x   /  AST  22  /  ALT  10  /  AlkPhos  51  07-22    CARDIAC MARKERS ( 22 Jul 2020 18:20 )  x     / <0.01 ng/mL / x     / x     / x      CARDIAC MARKERS ( 22 Jul 2020 13:45 )  x     / <0.01 ng/mL / x     / x     / x        TELEMETRY EVENTS: 	    ECG: < from: 12 Lead ECG (07.22.20 @ 18:05) >  Diagnosis Line Atrial-paced rhythm with prolonged AV conduction  Abnormal ECG   	  RADIOLOGY: < from: Xray Chest 1 View- PORTABLE-Urgent (07.22.20 @ 14:07) >  Cardiomegaly. Support devices as described. Unchanged.      OTHER: 	    PREVIOUS DIAGNOSTIC TESTING:    [x] Echocardiogram:   < from: Transthoracic Echocardiogram (12.09.18 @ 09:19) >   1. Left ventricular ejection fraction, by visual estimation, is 55 to   60%.   2. Normal global left ventricular systolic function.   3. Spectral Doppler shows impaired relaxation pattern of left   ventricular myocardial filling (Grade I diastolic dysfunction).   4. Mild tricuspid regurgitation.   5. Trace pulmonic valve regurgitation.    [x Catheterization:    [x Stress Test:  	< from: NM Nuclear Stress Pharmacologic Multiple (12.10.18 @ 16:48) >  1. NORMAL ADENOSINE / REST MYOCARDIAL PERFUSION TOMOGRAPHY, WITH NO   EVIDENCE FOR ISCHEMIA DURING ADENOSINE INFUSION.   2. NORMAL RESTING LEFT VENTRICULAR WALL MOTION AND WALL THICKENING.  3. LEFT VENTRICULAR EJECTION FRACTION OF  more than 75%WITHIN RANGE OF   NORMAL. NO SIGNIFICANT INTERVAL CHANGE AS COMPARED TO PRIOR EXAMINATION.  	  Home Medications:  aspirin 81 mg oral tablet, chewable: 1 tab(s) orally once a day (22 Jul 2020 13:55)  Crestor: 20 milligram(s) orally once a day (22 Jul 2020 13:55)  levothyroxine 25 mcg (0.025 mg) oral tablet: 1 tab(s) orally once a day (22 Jul 2020 13:55)  Metoprolol Succinate ER 25 mg oral tablet, extended release: 1 tab(s) orally once a day (at bedtime) (22 Jul 2020 13:55)  nitroglycerin 0.4 mg/hr transdermal film, extended release:  (22 Jul 2020 13:55)  Xarelto 20 mg oral tablet: 1 tab(s) orally once a day (in the evening) (22 Jul 2020 13:55)    MEDICATIONS  (STANDING):  levothyroxine 25 MICROGram(s) Oral daily  metoprolol succinate ER 25 milliGRAM(s) Oral daily  pantoprazole    Tablet 40 milliGRAM(s) Oral before breakfast  rivaroxaban 20 milliGRAM(s) Oral with dinner    MEDICATIONS  (PRN): Date of Admission: 07-22-20    CHIEF COMPLAINT: Patient is a 84y old  Female who presents with a chief complaint of near syncopal events.    HPI: 85y/o F with PMG of Afib on xarelto, hx of conduction disorder s/p PPM (2018), hypothyroidism presents with near syncopal events. Started 3-4d ago with undefined malaise and palpitations described "I thought my heart would stop", feeling irregular. A/w flushed feeling, nausea, mild CP at top of sternum w/ no substernal. Nothing triggers it in particular, can happen any time, 3-4X/day, lasting 1-2min. She sits and it improves. Tried 0.1 NG transdermal patch w/o improvement. Day of presentation she felt worsening malaise so came to ED. No SOB, dizziness/vomiting, fever/cough, OBANDO, constipation/diarrhea, inc in leg swelling/weight. No recent changes in meds.      PAST MEDICAL & SURGICAL HISTORY:  Conduction disorder of the heart  Hyperlipidemia, unspecified hyperlipidemia type  Hypothyroidism, unspecified type  Atrial fibrillation, unspecified type  H/O cardiac pacemaker      FAMILY HISTORY:  Family history of early CAD (Mother)  Family history of breast cancer (Sibling)    SOCIAL HISTORY:    [x] Non-smoker  [x] No alcohol use  [x] No illicit drug use    Allergies    No Known Allergies    Intolerances    	    REVIEW OF SYSTEMS:  CONSTITUTIONAL: No fever, weight loss, or fatigue. (+) Malaise and flushing.  CARDIOLOGY: (+) Palpitations. No angina, chest pain, dyspnea of exertion, or syncopal episodes.   RESPIRATORY: No shortness of breath, cough, wheezing.   NEUROLOGICAL: No weakness, no focal deficits to report. (+) Lightheadedness.  GI: No BRBPR, no N,V,diarrhea.    PSYCHIATRY: Normal mood and affect.  HEENT: No nasal discharge, no ecchymosis  SKIN: No ecchymosis, no breakdown  MUSCULOSKELETAL: Full range of motion x4.   EXTREM: No leg swelling or erythema.    PHYSICAL EXAM:  T(C): 36.1 (07-23-20 @ 07:19), Max: 36.9 (07-22-20 @ 12:57)  HR: 84 (07-23-20 @ 07:19) (60 - 84)  BP: 120/69 (07-23-20 @ 07:19) (115/57 - 149/63)  RR: 18 (07-23-20 @ 07:19) (15 - 18)  SpO2: 99% (07-23-20 @ 07:19) (97% - 99%)  Wt(kg): --  I&O's Summary      General Appearance: NAD, normal for age and gender. 	  Neck: Normal JVP, no bruit.   Eyes: No xanthomalasia, Extra Ocular muscles intact.   Cardiovascular: Regular rate and rhythm S1 S2, No JVD, No murmurs.  Respiratory: Lungs clear to auscultation. No wheezes, rales or rhonchi.  Psychiatry: Alert and oriented x 3, Mood & affect appropriate  Gastrointestinal:  Soft, Non-tender  Skin/Integumen: No rashes, No ecchymoses, No cyanosis	  Neurologic: Non-focal deficits.  Musculoskeletal/ extremities: Normal range of motion, No clubbing, cyanosis or edema  Vascular: Peripheral pulses palpable bilaterally    LABS:	 	                        12.6   5.35  )-----------( 203      ( 22 Jul 2020 13:45 )             38.8     07-22    138  |  103  |  13  ----------------------------<  127<H>  4.3   |  21  |  0.9    Ca    9.1      22 Jul 2020 13:45  Mg     2.1     07-22    TPro  7.1  /  Alb  4.1  /  TBili  0.2  /  DBili  x   /  AST  22  /  ALT  10  /  AlkPhos  51  07-22    CARDIAC MARKERS ( 22 Jul 2020 18:20 )  x     / <0.01 ng/mL / x     / x     / x      CARDIAC MARKERS ( 22 Jul 2020 13:45 )  x     / <0.01 ng/mL / x     / x     / x        TELEMETRY EVENTS: 	    ECG: < from: 12 Lead ECG (07.22.20 @ 18:05) >  Diagnosis Line Atrial-paced rhythm with prolonged AV conduction  Abnormal ECG   	  RADIOLOGY: < from: Xray Chest 1 View- PORTABLE-Urgent (07.22.20 @ 14:07) >  Cardiomegaly. Support devices as described. Unchanged.      OTHER: 	    PREVIOUS DIAGNOSTIC TESTING:    [x] Echocardiogram:   < from: Transthoracic Echocardiogram (12.09.18 @ 09:19) >   1. Left ventricular ejection fraction, by visual estimation, is 55 to   60%.   2. Normal global left ventricular systolic function.   3. Spectral Doppler shows impaired relaxation pattern of left   ventricular myocardial filling (Grade I diastolic dysfunction).   4. Mild tricuspid regurgitation.   5. Trace pulmonic valve regurgitation.    [x Catheterization:    [x Stress Test:  	< from: NM Nuclear Stress Pharmacologic Multiple (12.10.18 @ 16:48) >  1. NORMAL ADENOSINE / REST MYOCARDIAL PERFUSION TOMOGRAPHY, WITH NO   EVIDENCE FOR ISCHEMIA DURING ADENOSINE INFUSION.   2. NORMAL RESTING LEFT VENTRICULAR WALL MOTION AND WALL THICKENING.  3. LEFT VENTRICULAR EJECTION FRACTION OF  more than 75%WITHIN RANGE OF   NORMAL. NO SIGNIFICANT INTERVAL CHANGE AS COMPARED TO PRIOR EXAMINATION.  	  Home Medications:  aspirin 81 mg oral tablet, chewable: 1 tab(s) orally once a day (22 Jul 2020 13:55)  Crestor: 20 milligram(s) orally once a day (22 Jul 2020 13:55)  levothyroxine 25 mcg (0.025 mg) oral tablet: 1 tab(s) orally once a day (22 Jul 2020 13:55)  Metoprolol Succinate ER 25 mg oral tablet, extended release: 1 tab(s) orally once a day (at bedtime) (22 Jul 2020 13:55)  nitroglycerin 0.4 mg/hr transdermal film, extended release:  (22 Jul 2020 13:55)  Xarelto 20 mg oral tablet: 1 tab(s) orally once a day (in the evening) (22 Jul 2020 13:55)  lisinopril 10 mg po q24h  amlodipine 2.5 mg po q24h  dexlansoprazole   dicyclomine    MEDICATIONS  (STANDING):  levothyroxine 25 MICROGram(s) Oral daily  metoprolol succinate ER 25 milliGRAM(s) Oral daily  pantoprazole    Tablet 40 milliGRAM(s) Oral before breakfast  rivaroxaban 20 milliGRAM(s) Oral with dinner    MEDICATIONS  (PRN):

## 2020-07-23 NOTE — ED CDU PROVIDER DISPOSITION NOTE - ATTENDING CONTRIBUTION TO CARE
· Medical Decision Making Details: 85yo woman h/o afib on xarelto, sinus arrest s/p PPM, HLD, hypothyroid was placed in CDU for workup for near syncope with palpitations at home. Sx usually last about 1 minute, pt feels discomfort, but no lindy chest pain. Pt follows with Dr Soto and Dr Dugan. ED workup with EKG, labs was unremarkable. Pt was monitored without incident in CDU; repeat EKG and enzymes unchanged. She was seen by Dr Dugan and PPM was interrogated; there were no events. PPM was adjusted to MVP (Managed Ventricular Pacing). Rec d/c metoprolol and follow up for outpt tilt-table test. Cardiology also evaluated, no need for further ACS workup for now but rec prompt f/u with Dr Soto and Dr Dugan. Pt still with occasional sx but without rhythm changes on the monitor or PPM. She will f/u with Dr Beheit for tilt table and return for worsening sx.

## 2020-07-23 NOTE — ED CDU PROVIDER SUBSEQUENT DAY NOTE - PROGRESS NOTE DETAILS
patient resting comfortably. no complaints. will continue to observe pt experiencing CP, ekg obtained, EP consulted to interrogate patients pacemaker. will continue to monitor patient. Pt willg et bedside echo. Pts cardiac enzymes negative x2.

## 2020-07-23 NOTE — CONSULT NOTE ADULT - SUBJECTIVE AND OBJECTIVE BOX
Patient is a 84y old  Female who presents with a chief complaint of       HPI:      Electrophysiology:  84y Female with h/o PAF on Xarelto, h/o sinus arrest , DC PPM ([employer]) placed at that time, hypothyroidism, HLD, recently diagnosed Parkinson disease, presented to ED c/o recurrent episodes of "feeling week, not right, hot flush and nausea, my heart about to stop" episodes lasting couple of minutes, independent of activities. Had episode this AM and came to ED.  On tele pt is APaced 60 bpm  PPM interrogated, working properly pt is APaced 96% of the time.  amplitude adjusted for 2x safety margin  Device re-programmed for rate response.    REVIEW OF SYSTEMS    [x ] A ten-point review of systems was otherwise negative except as noted.      PAST MEDICAL & SURGICAL HISTORY:  Conduction disorder of the heart  Hyperlipidemia, unspecified hyperlipidemia type  Hypothyroidism, unspecified type  Atrial fibrillation, unspecified type  H/O cardiac pacemaker      Home Medications:  aspirin 81 mg oral tablet, chewable: 1 tab(s) orally once a day (2020 13:55)  Crestor: 20 milligram(s) orally once a day (2020 13:55)  levothyroxine 25 mcg (0.025 mg) oral tablet: 1 tab(s) orally once a day (2020 13:55)  Metoprolol Succinate ER 25 mg oral tablet, extended release: 1 tab(s) orally once a day (at bedtime) (2020 13:55)  nitroglycerin 0.4 mg/hr transdermal film, extended release:  (2020 13:55)  Xarelto 20 mg oral tablet: 1 tab(s) orally once a day (in the evening) (2020 13:55)      Allergies:  No Known Allergies      FAMILY HISTORY:  Family history of early CAD (Mother)  Family history of breast cancer (Sibling)      SOCIAL HISTORY: denies tobacco / ETOH / illicit drug use    CIGARETTES:  ALCOHOL:        PREVIOUS DIAGNOSTIC TESTING:      ECHO  FINDINGS:  < from: Transthoracic Echocardiogram (18 @ 09:19) >  Summary:   1. Left ventricular ejection fraction, by visual estimation, is 55 to   60%.   2. Normal global left ventricular systolic function.   3. Spectral Doppler shows impaired relaxation pattern of left   ventricular myocardial filling (Grade I diastolic dysfunction).   4. Mild tricuspid regurgitation.   5. Trace pulmonic valve regurgitation.  < end of copied text >    STRESS  FINDINGS:  < from: NM Nuclear Stress Pharmacologic Multiple (.10.18 @ 16:48) >  Impression:     1. NORMAL ADENOSINE / REST MYOCARDIAL PERFUSION TOMOGRAPHY, WITH NO   EVIDENCE FOR ISCHEMIA DURING ADENOSINE INFUSION.   2. NORMAL RESTING LEFT VENTRICULAR WALL MOTION AND WALL THICKENING.  3. LEFT VENTRICULAR EJECTION FRACTION OF  more than 75%WITHIN RANGE OF   NORMAL. NO SIGNIFICANT INTERVAL CHANGE AS COMPARED TO PRIOR EXAMINATION.    < end of copied text >      MEDICATIONS  (STANDING):  levothyroxine 25 MICROGram(s) Oral daily  metoprolol succinate ER 25 milliGRAM(s) Oral daily  pantoprazole    Tablet 40 milliGRAM(s) Oral before breakfast  rivaroxaban 20 milliGRAM(s) Oral with dinner    MEDICATIONS  (PRN):      Vital Signs Last 24 Hrs  T(C): 36.1 (2020 07:19), Max: 36.9 (2020 12:57)  T(F): 97 (2020 07:19), Max: 98.4 (2020 12:57)  HR: 84 (2020 07:19) (60 - 84)  BP: 120/69 (2020 07:19) (115/57 - 149/63)  BP(mean): --  RR: 18 (2020 07:19) (15 - 18)  SpO2: 99% (2020 07:19) (97% - 99%)    PHYSICAL EXAM:    GENERAL: In no apparent distress, well nourished, and hydrated.  HEAD:  Atraumatic, Normocephalic  EYES: EOMI, PERRLA, conjunctiva and sclera clear  NECK: Supple and normal thyroid.  No JVD or carotid bruit.  Carotid pulse is 2+ bilaterally.  HEART: Regular rate and rhythm; No murmurs, rubs, or gallops.  PULMONARY: Clear to auscultation and perfusion.  No rales, wheezing, or rhonchi bilaterally.  ABDOMEN: Soft, Nontender, Nondistended; Bowel sounds present  EXTREMITIES:  2+ Peripheral Pulses, No clubbing, cyanosis, or edema  NEUROLOGICAL: Grossly nonfocal    I&O's Detail    Daily     Daily     INTERPRETATION OF TELEMETRY:    EC Lead ECG:   Ventricular Rate 63 BPM    Atrial Rate 63 BPM    P-R Interval 218 ms    QRS Duration 86 ms    Q-T Interval 416 ms    QTC Calculation(Bezet) 425 ms    R Axis -21 degrees    T Axis 20 degrees    Diagnosis Line Atrial-paced rhythm with prolonged AV conduction  Abnormal ECG    Confirmed by Miguel Ellis ([id number]) on 2020 10:27:01 PM (20 @ 18:05)  12 Lead ECG:   Ventricular Rate 87 BPM    Atrial Rate 87 BPM    P-R Interval 204 ms    QRS Duration 94 ms    Q-T Interval 378 ms    QTC Calculation(Bezet) 454 ms    P Axis 58 degrees    R Axis -57 degrees    T Axis 24 degrees    Diagnosis Line Atrial-paced rhythmwith Premature atrial complexes  Left axis deviation  Incomplete right bundle branch block  Minimal voltage criteria for LVH, may be normal variant  Abnormal ECG    Confirmed by Miguel Ellis ([id number]) on 2020 4:16:10 PM (20 @ 13:03)        LABS:                        12.6   5.35  )-----------( 203      ( 2020 13:45 )             38.8         138  |  103  |  13  ----------------------------<  127<H>  4.3   |  21  |  0.9    Ca    9.1      2020 13:45  Mg     2.1         TPro  7.1  /  Alb  4.1  /  TBili  0.2  /  DBili  x   /  AST    /  ALT  10  /  AlkPhos  51      CARDIAC MARKERS ( 2020 18:20 )  x     / <0.01 ng/mL / x     / x     / x      CARDIAC MARKERS ( 2020 13:45 )  x     / <0.01 ng/mL / x     / x     / x              BNPSerum Pro-Brain Natriuretic Peptide: 216 pg/mL ( @ 13:45)      RADIOLOGY & ADDITIONAL STUDIES:  < from: Xray Chest 1 View- PORTABLE-Urgent (20 @ 14:07) >  Findings:    Support devices: Dual-chamber left-sided pacemaker.    Cardiac/mediastinum/hilum: Heart is enlarged.    Lung parenchyma/Pleura: Within normal limits.    Skeleton/soft tissues: Unremarkable.    Impression:      Cardiomegaly. Support devices as described. Unchanged.    < end of copied text >

## 2020-07-23 NOTE — ED CDU PROVIDER SUBSEQUENT DAY NOTE - ATTENDING CONTRIBUTION TO CARE
83yo woman h/o afib on xarelto, sinus arrest s/p PPM, HLD, hypothyroid was placed in CDU for workup for near syncope with palpitations at home. Sx usually last about 1 minute, pt feels discomfort, but no lindy chest pain. Pt follows with Dr Soto and Dr Dugan. ED workup with EKG, labs was unremarkable. Pt was monitored without incident in CDU; repeat EKG and enzymes unchanged. She was seen by Dr Dugan and PPM was interrogated; there were no events. PPM was adjusted to MVP (Managed Ventricular Pacing). Rec d/c metoprolol and follow up for outpt tilt-table test. Cardiology also evaluated, no need for further ACS workup for now but rec prompt f/u with Dr Soto and Dr Dugan. Pt still with occasional sx but without rhythm changes on the monitor or PPM. She will f/u with Dr Beheit for tilt table and return for worsening sx.

## 2020-07-23 NOTE — ED CDU PROVIDER DISPOSITION NOTE - CLINICAL COURSE
85yo woman h/o afib on xarelto, sinus arrest s/p PPM, HLD, hypothyroid was placed in CDU for workup for near syncope with palpitations at home. Sx usually last about 1 minute, pt feels discomfort, but no lindy chest pain. Pt follows with Dr Soto and Dr Dugan. ED workup with EKG, labs was unremarkable. Pt was monitored without incident in CDU; repeat EKG and enzymes unchanged. She was seen by Dr Dugan and PPM was interrogated; there were no events. PPM was adjusted to MVP (Managed Ventricular Pacing). Rec d/c metoprolol and follow up for outpt tilt-table test; felt sx could be due to dysautonomia from Parkinson's dz (pt recent dx). Cardiology also evaluated, no need for further ACS workup for now but rec prompt f/u with Dr Soto and Dr Dugan. Pt still with occasional sx but without rhythm changes on the monitor or PPM. She will f/u with Dr Beheit for tilt table and return for worsening sx.

## 2020-07-25 LAB
CULTURE RESULTS: SIGNIFICANT CHANGE UP
SPECIMEN SOURCE: SIGNIFICANT CHANGE UP

## 2020-07-28 PROBLEM — I45.9 CONDUCTION DISORDER, UNSPECIFIED: Chronic | Status: ACTIVE | Noted: 2020-07-22

## 2020-08-10 ENCOUNTER — OUTPATIENT (OUTPATIENT)
Dept: OUTPATIENT SERVICES | Facility: HOSPITAL | Age: 85
LOS: 1 days | Discharge: HOME | End: 2020-08-10

## 2020-08-10 ENCOUNTER — LABORATORY RESULT (OUTPATIENT)
Age: 85
End: 2020-08-10

## 2020-08-10 DIAGNOSIS — Z11.59 ENCOUNTER FOR SCREENING FOR OTHER VIRAL DISEASES: ICD-10-CM

## 2020-08-10 DIAGNOSIS — Z95.0 PRESENCE OF CARDIAC PACEMAKER: Chronic | ICD-10-CM

## 2020-08-13 ENCOUNTER — OUTPATIENT (OUTPATIENT)
Dept: OUTPATIENT SERVICES | Facility: HOSPITAL | Age: 85
LOS: 1 days | Discharge: HOME | End: 2020-08-13
Payer: MEDICARE

## 2020-08-13 DIAGNOSIS — R55 SYNCOPE AND COLLAPSE: ICD-10-CM

## 2020-08-13 DIAGNOSIS — Z95.0 PRESENCE OF CARDIAC PACEMAKER: Chronic | ICD-10-CM

## 2020-08-13 PROCEDURE — 93660 TILT TABLE EVALUATION: CPT | Mod: 26

## 2020-08-13 RX ORDER — MIDODRINE HYDROCHLORIDE 2.5 MG/1
1 TABLET ORAL
Qty: 30 | Refills: 3
Start: 2020-08-13 | End: 2020-12-10

## 2020-08-28 ENCOUNTER — APPOINTMENT (OUTPATIENT)
Dept: CARDIOLOGY | Facility: CLINIC | Age: 85
End: 2020-08-28
Payer: MEDICARE

## 2020-08-28 VITALS
HEART RATE: 78 BPM | SYSTOLIC BLOOD PRESSURE: 120 MMHG | TEMPERATURE: 98.2 F | DIASTOLIC BLOOD PRESSURE: 80 MMHG | HEIGHT: 59.84 IN | WEIGHT: 166 LBS | BODY MASS INDEX: 32.59 KG/M2

## 2020-08-28 DIAGNOSIS — E03.9 HYPOTHYROIDISM, UNSPECIFIED: ICD-10-CM

## 2020-08-28 DIAGNOSIS — Z86.79 PERSONAL HISTORY OF OTHER DISEASES OF THE CIRCULATORY SYSTEM: ICD-10-CM

## 2020-08-28 DIAGNOSIS — Z00.00 ENCOUNTER FOR GENERAL ADULT MEDICAL EXAMINATION W/OUT ABNORMAL FINDINGS: ICD-10-CM

## 2020-08-28 DIAGNOSIS — Z82.49 FAMILY HISTORY OF ISCHEMIC HEART DISEASE AND OTHER DISEASES OF THE CIRCULATORY SYSTEM: ICD-10-CM

## 2020-08-28 DIAGNOSIS — Z78.9 OTHER SPECIFIED HEALTH STATUS: ICD-10-CM

## 2020-08-28 DIAGNOSIS — Z82.61 FAMILY HISTORY OF ARTHRITIS: ICD-10-CM

## 2020-08-28 PROCEDURE — 99214 OFFICE O/P EST MOD 30 MIN: CPT | Mod: 25

## 2020-08-28 PROCEDURE — 93280 PM DEVICE PROGR EVAL DUAL: CPT | Mod: 59

## 2020-08-28 RX ORDER — DEXLANSOPRAZOLE 30 MG/1
30 CAPSULE, DELAYED RELEASE ORAL
Refills: 0 | Status: ACTIVE | COMMUNITY

## 2020-08-28 RX ORDER — LEVOTHYROXINE SODIUM 0.03 MG/1
25 TABLET ORAL DAILY
Refills: 0 | Status: ACTIVE | COMMUNITY

## 2020-08-28 NOTE — ASSESSMENT
[FreeTextEntry1] : EKG 60 NORMAL SINUS RHYTHM\par HAD HUT ON 7/22 REVEALED ORTHOSTATIC HYPOTENSION\par NO RECURRENCE OF SYNCOPE OR PRE SYNCOPE\par BP WELL CONTROLLED\par ATYPICAL CHEST PAIN MOST LIKELY GI ETIOLOGY\par \par PLAN\par CONT TO MONITOR BP\par RECOMMEND GI CONSULT \par CONTINUE TO FOLLOW UP WITH DR. AMADOR\par \par RTO IN 3 MOS\par

## 2020-08-28 NOTE — REASON FOR VISIT
[New Patient Device Check] : new patient device check visit [Spouse] : spouse [FreeTextEntry1] : Consult Note:\par Provider Specialty:\par Electrophysiology.\par \par Referral/Consultation:\par Initial Consult:\par - Requested by Name: Dr Shah\par - Date/Time: 23-Jul-2020 11:22\par - Reason for Referral/Consultation: persistent dizziness\par \par \par - Subjective and Objective:\par Patient is a 84y old Female who presents with a chief complaint of\par \par \par HPI:\par \par \par Electrophysiology:\par 84y Female with h/o PAF on Xarelto, h/o sinus arrest 2014, DC PPM ([employer])\par placed at that time, hypothyroidism, HLD, recently diagnosed Parkinson disease,\par presented to ED c/o recurrent episodes of "feeling week, not right, hot flush\par and nausea, my heart about to stop" episodes lasting couple of minutes,\par independent of activities. Had episode this AM and came to ED.\par On tele pt is APaced 60 bpm\par PPM interrogated, working properly pt is APaced 96% of the time.\par amplitude adjusted for 2x safety margin\par Device re-programmed for rate response 7//22/20\par \par NO RECURRENCE  OF WEAKNESS OR SYNCOPE OR  PRE SYNCOPE\par C/O CHEST  PAIN  PRESSURE TYPE RADIATING TO  HER BACK  NOT  RELATED \par  TO REEXCISE BUT  CAB WALK A MILE EVERY  DAY  WITH  NO CHEST  PAIN.  8/28/20\par \par

## 2020-08-28 NOTE — REASON FOR VISIT
[Spouse] : spouse [New Patient Device Check] : new patient device check visit [FreeTextEntry1] : Consult Note:\par Provider Specialty:\par Electrophysiology.\par \par Referral/Consultation:\par Initial Consult:\par - Requested by Name: Dr Shah\par - Date/Time: 23-Jul-2020 11:22\par - Reason for Referral/Consultation: persistent dizziness\par \par \par - Subjective and Objective:\par Patient is a 84y old Female who presents with a chief complaint of\par \par \par HPI:\par \par \par Electrophysiology:\par 84y Female with h/o PAF on Xarelto, h/o sinus arrest 2014, DC PPM ([employer])\par placed at that time, hypothyroidism, HLD, recently diagnosed Parkinson disease,\par presented to ED c/o recurrent episodes of "feeling week, not right, hot flush\par and nausea, my heart about to stop" episodes lasting couple of minutes,\par independent of activities. Had episode this AM and came to ED.\par On tele pt is APaced 60 bpm\par PPM interrogated, working properly pt is APaced 96% of the time.\par amplitude adjusted for 2x safety margin\par Device re-programmed for rate response 7//22/20\par \par NO RECURRENCE  OF WEAKNESS OR SYNCOPE OR  PRE SYNCOPE\par C/O CHEST  PAIN  PRESSURE TYPE RADIATING TO  HER BACK  NOT  RELATED \par  TO REEXCISE BUT  CAB WALK A MILE EVERY  DAY  WITH  NO CHEST  PAIN.  8/28/20\par \par

## 2020-08-28 NOTE — PHYSICAL EXAM
[Well Groomed] : well groomed [Normal Appearance] : normal appearance [General Appearance - Well Developed] : well developed [No Deformities] : no deformities [General Appearance - Well Nourished] : well nourished [Heart Rate And Rhythm] : heart rate and rhythm were normal [General Appearance - In No Acute Distress] : no acute distress [Heart Sounds] : normal S1 and S2 [Murmurs] : no murmurs present [Respiration, Rhythm And Depth] : normal respiratory rhythm and effort [Exaggerated Use Of Accessory Muscles For Inspiration] : no accessory muscle use [Auscultation Breath Sounds / Voice Sounds] : lungs were clear to auscultation bilaterally [Cyanosis, Localized] : no localized cyanosis [Petechial Hemorrhages (___cm)] : no petechial hemorrhages [Nail Clubbing] : no clubbing of the fingernails [] : no ischemic changes

## 2020-08-28 NOTE — PHYSICAL EXAM
[General Appearance - Well Developed] : well developed [Well Groomed] : well groomed [Normal Appearance] : normal appearance [General Appearance - Well Nourished] : well nourished [No Deformities] : no deformities [Heart Sounds] : normal S1 and S2 [General Appearance - In No Acute Distress] : no acute distress [Heart Rate And Rhythm] : heart rate and rhythm were normal [Murmurs] : no murmurs present [Respiration, Rhythm And Depth] : normal respiratory rhythm and effort [Exaggerated Use Of Accessory Muscles For Inspiration] : no accessory muscle use [Auscultation Breath Sounds / Voice Sounds] : lungs were clear to auscultation bilaterally [Nail Clubbing] : no clubbing of the fingernails [Petechial Hemorrhages (___cm)] : no petechial hemorrhages [Cyanosis, Localized] : no localized cyanosis [] : no ischemic changes

## 2020-08-28 NOTE — PROCEDURE
[NSR] : normal sinus rhythm [AAIR] : AAIR [Voltage: ___ volts] : Voltage was [unfilled] volts [Pacemaker] : pacemaker [Longevity: ___ months] : The estimated remaining battery life is [unfilled] months [Sensing Amplitude ___mv] : sensing amplitude was [unfilled] mv [___V @] : [unfilled] V [Lead Imp:  ___ohms] : lead impedance was [unfilled] ohms [___ ms] : [unfilled] ms [None] : none [Asense-Vpace ___ %] : Asense-Vpace [unfilled]% [Asense-Vsense ___ %] : Asense-Vsense [unfilled]% [de-identified] : HARMONY GODINEZ MRI [de-identified] : [id number] [de-identified] : 11/14/2014 [de-identified] : [id number] [de-identified] : NO EVENTS  A/PACING 84 % V SEJSING 100% [de-identified] : 60

## 2020-08-28 NOTE — PROCEDURE
[NSR] : normal sinus rhythm [AAIR] : AAIR [Voltage: ___ volts] : Voltage was [unfilled] volts [Pacemaker] : pacemaker [Longevity: ___ months] : The estimated remaining battery life is [unfilled] months [___V @] : [unfilled] V [Lead Imp:  ___ohms] : lead impedance was [unfilled] ohms [Sensing Amplitude ___mv] : sensing amplitude was [unfilled] mv [___ ms] : [unfilled] ms [None] : none [Asense-Vpace ___ %] : Asense-Vpace [unfilled]% [Asense-Vsense ___ %] : Asense-Vsense [unfilled]% [de-identified] : HARMONY GODINEZ MRI [de-identified] : [id number] [de-identified] : 11/14/2014 [de-identified] : [id number] [de-identified] : NO EVENTS  A/PACING 84 % V SEJSING 100% [de-identified] : 60

## 2020-09-18 ENCOUNTER — RX RENEWAL (OUTPATIENT)
Age: 85
End: 2020-09-18

## 2020-09-30 NOTE — ED ADULT NURSE NOTE - OBJECTIVE STATEMENT
[facility]  [address] [facility], [address]  [place] [address]  Phone: [phone]  Fax: [phone]  [facility]: [id number]    Pt Name: Mehul Randhawa  Injury Date: 08/25/2017   Employee ID: [id number] Date of Treatment: 09/30/2020   Company: [employer]      Appointment Time: 09:15 AM Arrived: 9:29 AM   Provider: Misael Hinojosa MD Time Out: 10:30 AM     Office Treatment:   1. Strain of lumbar region, subsequent encounter    2. Chronic midline low back pain without sciatica    3. Lumbar strain, subsequent encounter    4. Osteoarthritis of lumbar spine, unspecified spinal osteoarthritis complication status      Medications Ordered This Encounter   Medications    lidocaine (LIDODERM) 5 %    naproxen (NAPROSYN) 500 MG tablet      Patient Instructions: Daily home exercises/warm soaks    Restrictions: Regular Duty     Return Appointment: 11/11/2020 at 9:30 AM  MARTINEZ         
pt came to the ER today with c/o chest pain. pt states pain started yesterday. pt with a previous history of chest pain.

## 2020-10-05 ENCOUNTER — RECORD ABSTRACTING (OUTPATIENT)
Age: 85
End: 2020-10-05

## 2020-10-05 DIAGNOSIS — Z86.79 PERSONAL HISTORY OF OTHER DISEASES OF THE CIRCULATORY SYSTEM: ICD-10-CM

## 2020-10-05 DIAGNOSIS — R55 SYNCOPE AND COLLAPSE: ICD-10-CM

## 2020-10-05 DIAGNOSIS — I48.92 UNSPECIFIED ATRIAL FLUTTER: ICD-10-CM

## 2020-10-05 DIAGNOSIS — Z87.898 PERSONAL HISTORY OF OTHER SPECIFIED CONDITIONS: ICD-10-CM

## 2020-10-05 RX ORDER — ZOLPIDEM TARTRATE 10 MG/1
10 TABLET ORAL
Refills: 0 | Status: ACTIVE | COMMUNITY

## 2020-10-05 RX ORDER — DOCUSATE SODIUM 100 MG
100 TABLET ORAL
Refills: 0 | Status: ACTIVE | COMMUNITY

## 2020-10-05 RX ORDER — DICYCLOMINE HYDROCHLORIDE 10 MG/1
10 CAPSULE ORAL
Refills: 0 | Status: ACTIVE | COMMUNITY

## 2020-10-05 RX ORDER — AMLODIPINE BESYLATE 2.5 MG/1
2.5 TABLET ORAL DAILY
Refills: 0 | Status: ACTIVE | COMMUNITY

## 2021-05-10 ENCOUNTER — APPOINTMENT (OUTPATIENT)
Dept: CARDIOLOGY | Facility: CLINIC | Age: 86
End: 2021-05-10

## 2021-07-12 ENCOUNTER — APPOINTMENT (OUTPATIENT)
Dept: CARDIOLOGY | Facility: CLINIC | Age: 86
End: 2021-07-12

## 2021-08-09 ENCOUNTER — APPOINTMENT (OUTPATIENT)
Dept: CARDIOLOGY | Facility: CLINIC | Age: 86
End: 2021-08-09

## 2021-11-13 ENCOUNTER — OUTPATIENT (OUTPATIENT)
Dept: OUTPATIENT SERVICES | Facility: HOSPITAL | Age: 86
LOS: 1 days | Discharge: HOME | End: 2021-11-13
Payer: MEDICARE

## 2021-11-13 DIAGNOSIS — R10.9 UNSPECIFIED ABDOMINAL PAIN: ICD-10-CM

## 2021-11-13 DIAGNOSIS — Z95.0 PRESENCE OF CARDIAC PACEMAKER: Chronic | ICD-10-CM

## 2021-11-13 PROCEDURE — 76700 US EXAM ABDOM COMPLETE: CPT | Mod: 26

## 2022-01-10 ENCOUNTER — EMERGENCY (EMERGENCY)
Facility: HOSPITAL | Age: 87
LOS: 0 days | Discharge: HOME | End: 2022-01-10
Attending: EMERGENCY MEDICINE | Admitting: EMERGENCY MEDICINE
Payer: MEDICARE

## 2022-01-10 VITALS
OXYGEN SATURATION: 100 % | RESPIRATION RATE: 18 BRPM | HEART RATE: 71 BPM | SYSTOLIC BLOOD PRESSURE: 165 MMHG | DIASTOLIC BLOOD PRESSURE: 70 MMHG | HEIGHT: 63 IN | TEMPERATURE: 98 F | WEIGHT: 169.98 LBS

## 2022-01-10 DIAGNOSIS — E03.9 HYPOTHYROIDISM, UNSPECIFIED: ICD-10-CM

## 2022-01-10 DIAGNOSIS — J02.9 ACUTE PHARYNGITIS, UNSPECIFIED: ICD-10-CM

## 2022-01-10 DIAGNOSIS — K21.9 GASTRO-ESOPHAGEAL REFLUX DISEASE WITHOUT ESOPHAGITIS: ICD-10-CM

## 2022-01-10 DIAGNOSIS — I10 ESSENTIAL (PRIMARY) HYPERTENSION: ICD-10-CM

## 2022-01-10 DIAGNOSIS — Z95.0 PRESENCE OF CARDIAC PACEMAKER: Chronic | ICD-10-CM

## 2022-01-10 DIAGNOSIS — U07.1 COVID-19: ICD-10-CM

## 2022-01-10 DIAGNOSIS — Z79.01 LONG TERM (CURRENT) USE OF ANTICOAGULANTS: ICD-10-CM

## 2022-01-10 DIAGNOSIS — I48.91 UNSPECIFIED ATRIAL FIBRILLATION: ICD-10-CM

## 2022-01-10 DIAGNOSIS — R19.7 DIARRHEA, UNSPECIFIED: ICD-10-CM

## 2022-01-10 DIAGNOSIS — E78.5 HYPERLIPIDEMIA, UNSPECIFIED: ICD-10-CM

## 2022-01-10 DIAGNOSIS — R51.9 HEADACHE, UNSPECIFIED: ICD-10-CM

## 2022-01-10 LAB
ANION GAP SERPL CALC-SCNC: 15 MMOL/L — HIGH (ref 7–14)
BUN SERPL-MCNC: 15 MG/DL — SIGNIFICANT CHANGE UP (ref 10–20)
CALCIUM SERPL-MCNC: 8.7 MG/DL — SIGNIFICANT CHANGE UP (ref 8.5–10.1)
CHLORIDE SERPL-SCNC: 102 MMOL/L — SIGNIFICANT CHANGE UP (ref 98–110)
CO2 SERPL-SCNC: 17 MMOL/L — SIGNIFICANT CHANGE UP (ref 17–32)
CREAT SERPL-MCNC: 0.9 MG/DL — SIGNIFICANT CHANGE UP (ref 0.7–1.5)
GLUCOSE SERPL-MCNC: 112 MG/DL — HIGH (ref 70–99)
HCT VFR BLD CALC: 35.5 % — LOW (ref 37–47)
HGB BLD-MCNC: 11.9 G/DL — LOW (ref 12–16)
MCHC RBC-ENTMCNC: 28.7 PG — SIGNIFICANT CHANGE UP (ref 27–31)
MCHC RBC-ENTMCNC: 33.5 G/DL — SIGNIFICANT CHANGE UP (ref 32–37)
MCV RBC AUTO: 85.5 FL — SIGNIFICANT CHANGE UP (ref 81–99)
NRBC # BLD: 0 /100 WBCS — SIGNIFICANT CHANGE UP (ref 0–0)
PLATELET # BLD AUTO: 180 K/UL — SIGNIFICANT CHANGE UP (ref 130–400)
POTASSIUM SERPL-MCNC: 3.6 MMOL/L — SIGNIFICANT CHANGE UP (ref 3.5–5)
POTASSIUM SERPL-SCNC: 3.6 MMOL/L — SIGNIFICANT CHANGE UP (ref 3.5–5)
RBC # BLD: 4.15 M/UL — LOW (ref 4.2–5.4)
RBC # FLD: 14.1 % — SIGNIFICANT CHANGE UP (ref 11.5–14.5)
SARS-COV-2 RNA SPEC QL NAA+PROBE: DETECTED
SODIUM SERPL-SCNC: 134 MMOL/L — LOW (ref 135–146)
WBC # BLD: 5.37 K/UL — SIGNIFICANT CHANGE UP (ref 4.8–10.8)
WBC # FLD AUTO: 5.37 K/UL — SIGNIFICANT CHANGE UP (ref 4.8–10.8)

## 2022-01-10 PROCEDURE — 99284 EMERGENCY DEPT VISIT MOD MDM: CPT | Mod: FS,CS

## 2022-01-10 RX ORDER — RIVAROXABAN 15 MG-20MG
20 KIT ORAL ONCE
Refills: 0 | Status: DISCONTINUED | OUTPATIENT
Start: 2022-01-10 | End: 2022-01-10

## 2022-01-10 NOTE — ED ADULT NURSE NOTE - NSICDXFAMILYHX_GEN_ALL_CORE_FT
FAMILY HISTORY:  Mother  Still living? No  Family history of early CAD, Age at diagnosis: Age Unknown    Sibling  Still living? Yes, Estimated age: Age Unknown  Family history of breast cancer, Age at diagnosis: Age Unknown

## 2022-01-10 NOTE — ED PROVIDER NOTE - OBJECTIVE STATEMENT
86 year old female with pmhx noted presents with sore throat, ha, cough, and diarrhea x 1 day. pt took at home covid test today which was positive. pt denies sob, chest pain, fever, chills, abd pain , or n/v.

## 2022-01-10 NOTE — ED ADULT NURSE NOTE - OBJECTIVE STATEMENT
Pt is an 86yr female presenting to the ED for complaints of sore throat/ headache/ diarrhea/ SOB. PT tested (+) COVID today with home test.

## 2022-01-10 NOTE — ED ADULT NURSE NOTE - NSICDXPASTMEDICALHX_GEN_ALL_CORE_FT
PAST MEDICAL HISTORY:  Atrial fibrillation, unspecified type     Conduction disorder of the heart     Hyperlipidemia, unspecified hyperlipidemia type     Hypothyroidism, unspecified type

## 2022-01-10 NOTE — ED PROVIDER NOTE - NS ED ROS FT
Review of Systems:  	•	CONSTITUTIONAL - no fever, no diaphoresis, no chills  	•	SKIN - no rash  	•	HEMATOLOGIC - no bleeding, no bruising  	•	EYES - no eye pain, no blurry vision  	•	ENT - no change in hearing, + sore throat, no ear pain or tinnitus  	•	RESPIRATORY - no shortness of breath, + cough  	•	CARDIAC - no chest pain, no palpitations  	•	GI - no abd pain, no nausea, no vomiting, + diarrhea, no constipation  	•	GENITO-URINARY - no discharge, no dysuria; no hematuria, no increased urinary frequency  	•	MUSCULOSKELETAL - no joint paint, no swelling, no redness  	•	NEUROLOGIC - no weakness, + headache, no paresthesias, no LOC  	•	PSYCH - no anxiety, non suicidal, non homicidal, no hallucination, no depression

## 2022-01-10 NOTE — ED PROVIDER NOTE - ATTENDING CONTRIBUTION TO CARE
87 yo female with PMH of pacemaker, HTN, HLD, hypothyroid, GERD, Afib on Xarelto presents to the ER for sore throat/runny nose/frontal headache/loose non bloody stools x 3-4/nonproductive cough since yesterday. Took home covid test and it was +. Came to ER for evaluation. Denies fever/chills/vomiting/dizziness/SOB/CP/abdomen pain/dysuria/rash/neck stiffness. On exam  no tachypnea/tachycardia/hypoxia or increased work of breathing, ncat, no exudates/edema of oropharynx, perrl, eomi, Lungs CTAB Heart reg S1s2 Abdomen soft nt/nd +BS no mass, Ext no edema or calf tenderness, Neuro intact, ambulating around ER without assistance. Will check pcr covid test and refer for eval for outpatient monoclonal ab (discussed with son given [phone]), check labs to make sure no dehydration/hypokalemia from diarrhea, and reassess.    ALL: nkda  PMH as above  Meds simvastatin, metoprolol, synthroid, omeprazole, xarelto  SH no smoking or etoh  PMD can't recall the name

## 2022-01-10 NOTE — ED PROVIDER NOTE - CLINICAL SUMMARY MEDICAL DECISION MAKING FREE TEXT BOX
87 yo female with PMH of pacemaker, HTN, HLD, hypothyroid, GERD, Afib on Xarelto presents to the ER for sore throat/runny nose/frontal headache/loose non bloody stools x 3-4/nonproductive cough since yesterday. Took home covid test and it was +. Came to ER for evaluation. Denies fever/chills/vomiting/dizziness/SOB/CP/abdomen pain/dysuria/rash/neck stiffness. On exam  no tachypnea/tachycardia/hypoxia or increased work of breathing, ncat, no exudates/edema of oropharynx, perrl, eomi, Lungs CTAB Heart reg S1s2 Abdomen soft nt/nd +BS no mass, Ext no edema or calf tenderness, Neuro intact, ambulating around ER without assistance. Will check pcr covid test and refer for eval for outpatient monoclonal ab (discussed with son given [phone]), labs ok, given her nightly dose of Xarelto as she didn't bring her meds with her. Pt felt well enough to go home. Given referral for outpatient monoclonal ab therapy

## 2022-01-10 NOTE — ED PROVIDER NOTE - PATIENT PORTAL LINK FT
You can access the FollowMyHealth Patient Portal offered by [facility] by registering at the following website: [email or web address]. By joining [facility]’s FollowMyHealth portal, you will also be able to view your health information using other applications (apps) compatible with our system.

## 2022-05-03 ENCOUNTER — APPOINTMENT (OUTPATIENT)
Dept: CARDIOLOGY | Facility: CLINIC | Age: 87
End: 2022-05-03
Payer: MEDICARE

## 2022-05-03 DIAGNOSIS — R42 DIZZINESS AND GIDDINESS: ICD-10-CM

## 2022-05-03 PROCEDURE — 93000 ELECTROCARDIOGRAM COMPLETE: CPT

## 2022-05-03 PROCEDURE — 99214 OFFICE O/P EST MOD 30 MIN: CPT

## 2022-05-04 PROBLEM — R42 ORTHOSTATIC DIZZINESS: Status: ACTIVE | Noted: 2020-08-28

## 2022-05-25 ENCOUNTER — APPOINTMENT (OUTPATIENT)
Dept: CARDIOLOGY | Facility: CLINIC | Age: 87
End: 2022-05-25
Payer: MEDICARE

## 2022-05-25 VITALS
TEMPERATURE: 96.9 F | SYSTOLIC BLOOD PRESSURE: 122 MMHG | OXYGEN SATURATION: 98 % | HEART RATE: 69 BPM | BODY MASS INDEX: 31.61 KG/M2 | WEIGHT: 161 LBS | HEIGHT: 60 IN | DIASTOLIC BLOOD PRESSURE: 80 MMHG

## 2022-05-25 PROCEDURE — 93000 ELECTROCARDIOGRAM COMPLETE: CPT | Mod: 59

## 2022-05-25 PROCEDURE — 93280 PM DEVICE PROGR EVAL DUAL: CPT

## 2022-05-25 PROCEDURE — 99215 OFFICE O/P EST HI 40 MIN: CPT

## 2022-05-25 NOTE — PHYSICAL EXAM
[General Appearance - Well Developed] : well developed [Normal Appearance] : normal appearance [Well Groomed] : well groomed [General Appearance - Well Nourished] : well nourished [No Deformities] : no deformities [General Appearance - In No Acute Distress] : no acute distress [Heart Rate And Rhythm] : heart rate and rhythm were normal [Heart Sounds] : normal S1 and S2 [Murmurs] : no murmurs present [Clean] : clean [Dry] : dry [Well-Healed] : well-healed

## 2022-05-25 NOTE — PROCEDURE
[No] : not [NSR] : normal sinus rhythm [Pacemaker] : pacemaker [AAIR] : AAIR [Longevity: ___ months] : The estimated remaining battery life is [unfilled] months [Lead Imp:  ___ohms] : lead impedance was [unfilled] ohms [Sensing Amplitude ___mv] : sensing amplitude was [unfilled] mv [___V @] : [unfilled] V [___ ms] : [unfilled] ms [Programmed for Longevity] : output reprogrammed for improved battery longevity [Asense-Vsense ___ %] : Asense-Vsense [unfilled]% [Asense-Vpace ___ %] : Asense-Vpace [unfilled]% [Apace-Vsense ___ %] : Apace-Vsense [unfilled]% [Apace-Vpace ___ %] : Apace-Vpace [unfilled]% [de-identified] : [employer]ON[employer] [de-identified] : ADVISA [de-identified] : [id number] [de-identified] : 11-14-14 [de-identified] : 60 [de-identified] : no events\par pt has 1156 short v-v intervals on ventricular lead.

## 2022-05-25 NOTE — ASSESSMENT
[FreeTextEntry1] : ## Sinus Node Dysfunction s/p DC-PPM (MDT, 14, Non-dep)\par ## paroxysmal atrial fibrillation \par \par - PPM interrogation shows normally functioning DC-PPM. Battery life ok. No new events. Multiple short V-V events. Minimal V pacing. AP 8% . Sensitive to RV pacing.\par - - On Xarelto. Compliant. No bleeding issues. Patient to contact us if there is any bleeding issues, interruption or any issues with OAC. Patient to go to ER/call 911 if any major bleeding. \par - If no further episodes of AF in next year, we will discuss DC of OAC- will discuss risk/benefits in detail at that time.\par - Doesnt want to do remote monitoring.\par - Return in 6 months

## 2022-05-25 NOTE — HISTORY OF PRESENT ILLNESS
[de-identified] : Ms. DOHERTY is a 86 year-year old female with history of sinus arrest s/p DC-PPM (MDT, 14, Non-dep), ? paroxysmal atrial fibrillation, DL, HTN, Hypothyroidism, parkinsons is here to establish care for device checks and Atrial Fibrillation.\par \par She is accompanied by her . \par \par No recent episodes of AF. They are unaware of any AF episodes in past.\par \par Denies chest pain, shortness of breath, palpitation, dizziness or LOC except noted above.\par \par EKG (5/25): SR@ 69, , QRSd 86\par Cardio: Dr. Phelan

## 2022-05-31 ENCOUNTER — APPOINTMENT (OUTPATIENT)
Dept: CARDIOLOGY | Facility: CLINIC | Age: 87
End: 2022-05-31
Payer: MEDICARE

## 2022-05-31 PROCEDURE — 93306 TTE W/DOPPLER COMPLETE: CPT

## 2022-05-31 PROCEDURE — 93000 ELECTROCARDIOGRAM COMPLETE: CPT

## 2022-05-31 PROCEDURE — 99214 OFFICE O/P EST MOD 30 MIN: CPT

## 2022-09-19 NOTE — ED CDU PROVIDER INITIAL DAY NOTE - CPE EDP GASTRO NORM
normal... Xelmejiaz Pregnancy And Lactation Text: This medication is Pregnancy Category D and is not considered safe during pregnancy.  The risk during breast feeding is also uncertain.

## 2022-11-30 ENCOUNTER — APPOINTMENT (OUTPATIENT)
Dept: CARDIOLOGY | Facility: CLINIC | Age: 87
End: 2022-11-30

## 2022-11-30 VITALS
OXYGEN SATURATION: 98 % | SYSTOLIC BLOOD PRESSURE: 116 MMHG | DIASTOLIC BLOOD PRESSURE: 72 MMHG | HEART RATE: 78 BPM | HEIGHT: 60 IN | BODY MASS INDEX: 31.41 KG/M2 | WEIGHT: 160 LBS

## 2022-11-30 PROCEDURE — 93000 ELECTROCARDIOGRAM COMPLETE: CPT | Mod: 59

## 2022-11-30 PROCEDURE — 93280 PM DEVICE PROGR EVAL DUAL: CPT

## 2022-11-30 PROCEDURE — 99214 OFFICE O/P EST MOD 30 MIN: CPT

## 2022-11-30 RX ORDER — CHROMIUM 200 MCG
TABLET ORAL
Refills: 0 | Status: DISCONTINUED | COMMUNITY
End: 2022-11-30

## 2022-11-30 RX ORDER — ROSUVASTATIN CALCIUM 20 MG/1
20 TABLET, FILM COATED ORAL DAILY
Refills: 0 | Status: DISCONTINUED | COMMUNITY
End: 2022-11-30

## 2022-12-01 RX ORDER — PREDNISOLONE ACETATE 10 MG/ML
1 SUSPENSION/ DROPS OPHTHALMIC
Qty: 15 | Refills: 0 | Status: ACTIVE | COMMUNITY
Start: 2022-06-22

## 2022-12-01 RX ORDER — ERYTHROMYCIN 5 MG/G
5 OINTMENT OPHTHALMIC
Qty: 4 | Refills: 0 | Status: ACTIVE | COMMUNITY
Start: 2022-06-30

## 2022-12-01 RX ORDER — CHLORHEXIDINE GLUCONATE, 0.12% ORAL RINSE 1.2 MG/ML
0.12 SOLUTION DENTAL
Qty: 473 | Refills: 0 | Status: ACTIVE | COMMUNITY
Start: 2022-09-17

## 2022-12-01 RX ORDER — OMEPRAZOLE 20 MG/1
20 CAPSULE, DELAYED RELEASE ORAL
Qty: 60 | Refills: 0 | Status: ACTIVE | COMMUNITY
Start: 2022-11-03

## 2022-12-01 RX ORDER — OFLOXACIN 3 MG/ML
0.3 SOLUTION/ DROPS OPHTHALMIC
Qty: 5 | Refills: 0 | Status: ACTIVE | COMMUNITY
Start: 2022-06-22

## 2022-12-01 RX ORDER — KETOROLAC TROMETHAMINE 5 MG/ML
0.5 SOLUTION OPHTHALMIC
Qty: 5 | Refills: 0 | Status: ACTIVE | COMMUNITY
Start: 2022-06-06

## 2022-12-01 RX ORDER — METOPROLOL TARTRATE 25 MG/1
25 TABLET, FILM COATED ORAL
Qty: 180 | Refills: 0 | Status: ACTIVE | COMMUNITY
Start: 2022-11-25

## 2022-12-01 RX ORDER — ROSUVASTATIN CALCIUM 10 MG/1
10 TABLET, FILM COATED ORAL
Qty: 90 | Refills: 0 | Status: ACTIVE | COMMUNITY
Start: 2022-04-28

## 2022-12-03 NOTE — ADDENDUM
[FreeTextEntry1] : IViviana assisted in documentation on 12/02/2022 acting as a scribe for Dr. Terence Aldrich.\par

## 2022-12-03 NOTE — HISTORY OF PRESENT ILLNESS
[de-identified] : The patient noted the following symptom(s):. Ms. DOHERTY is a 86 year-year old female with history of sinus arrest s/p DC-PPM (MDT, 14, Non-dep), ? paroxysmal atrial fibrillation, DL, HTN, Hypothyroidism, parkinsons is here to establish care for device checks and Atrial Fibrillation.\par \par She is accompanied by her . \par \par No recent episodes of AF. They are unaware of any AF episodes in past.\par \par 11/30/22: Feels fine\par \par Denies chest pain, shortness of breath, palpitation, dizziness or LOC except noted above.\par \par EKG (5/25): SR@ 69, , QRSd 86\par EKG(11/30/22): SR\par Cardio: Dr. Phelan. \par

## 2022-12-03 NOTE — ASSESSMENT
[FreeTextEntry1] : ## Sinus Node Dysfunction s/p DC-PPM (MDT, 14, Non-dep)\par ## paroxysmal atrial fibrillation \par \par - PPM interrogation shows normally functioning DC-PPM. Battery life ok. No new events. Multiple short V-V events. Minimal V pacing. AP 8%. Sensitive to RV pacing.\par - - On Xarelto. Compliant. No bleeding issues. Patient to contact us if there is any bleeding issues, interruption or any issues with OAC. Patient to go to ER/call 911 if any major bleeding. \par - We discussed the possibility of discontinuing anticoagulation and discussed the risks and benefits in detail. After a detailed discussion, we decided to continue anticoagulation for now. \par - Doesn't want to do remote monitoring.\par - Return in 6 months. \par

## 2022-12-03 NOTE — PROCEDURE
[No] : not [NSR] : normal sinus rhythm [Pacemaker] : pacemaker [AAIR] : AAIR [Voltage: ___ volts] : Voltage was [unfilled] volts [Longevity: ___ months] : The estimated remaining battery life is [unfilled] months [Lead Imp:  ___ohms] : lead impedance was [unfilled] ohms [Sensing Amplitude ___mv] : sensing amplitude was [unfilled] mv [___V @] : [unfilled] V [___ ms] : [unfilled] ms [Programmed for Longevity] : output reprogrammed for improved battery longevity [de-identified] : [employer]TRONIC [de-identified] : ADVISA [de-identified] : [id number] [de-identified] : 11-14-14 [de-identified] : 60 [de-identified] : AP:14.3%\par : <0.1%\par 5 ST episodes all on 9/14/22 lasting up to 50 sec (avg. v-rate: 155 bpm).\par Some short v-v intervals noted on device history, however after testing there is no question of lead integrity.

## 2023-01-24 ENCOUNTER — FORM ENCOUNTER (OUTPATIENT)
Age: 88
End: 2023-01-24

## 2023-03-06 ENCOUNTER — NON-APPOINTMENT (OUTPATIENT)
Age: 88
End: 2023-03-06

## 2023-03-06 ENCOUNTER — APPOINTMENT (OUTPATIENT)
Dept: CARDIOLOGY | Facility: CLINIC | Age: 88
End: 2023-03-06
Payer: MEDICARE

## 2023-03-06 PROCEDURE — 93296 REM INTERROG EVL PM/IDS: CPT

## 2023-03-06 PROCEDURE — 93294 REM INTERROG EVL PM/LDLS PM: CPT

## 2023-05-31 ENCOUNTER — APPOINTMENT (OUTPATIENT)
Dept: CARDIOLOGY | Facility: CLINIC | Age: 88
End: 2023-05-31
Payer: MEDICARE

## 2023-05-31 ENCOUNTER — APPOINTMENT (OUTPATIENT)
Dept: ELECTROPHYSIOLOGY | Facility: CLINIC | Age: 88
End: 2023-05-31
Payer: MEDICARE

## 2023-05-31 VITALS
SYSTOLIC BLOOD PRESSURE: 112 MMHG | BODY MASS INDEX: 31.61 KG/M2 | HEIGHT: 60 IN | OXYGEN SATURATION: 98 % | WEIGHT: 161 LBS | HEART RATE: 73 BPM | DIASTOLIC BLOOD PRESSURE: 70 MMHG

## 2023-05-31 DIAGNOSIS — I48.0 PAROXYSMAL ATRIAL FIBRILLATION: ICD-10-CM

## 2023-05-31 DIAGNOSIS — I10 ESSENTIAL (PRIMARY) HYPERTENSION: ICD-10-CM

## 2023-05-31 DIAGNOSIS — I20.9 ANGINA PECTORIS, UNSPECIFIED: ICD-10-CM

## 2023-05-31 DIAGNOSIS — Z95.0 PRESENCE OF CARDIAC PACEMAKER: ICD-10-CM

## 2023-05-31 DIAGNOSIS — E78.5 HYPERLIPIDEMIA, UNSPECIFIED: ICD-10-CM

## 2023-05-31 DIAGNOSIS — I49.5 SICK SINUS SYNDROME: ICD-10-CM

## 2023-05-31 PROCEDURE — 93000 ELECTROCARDIOGRAM COMPLETE: CPT | Mod: 59

## 2023-05-31 PROCEDURE — 93280 PM DEVICE PROGR EVAL DUAL: CPT

## 2023-05-31 PROCEDURE — 99214 OFFICE O/P EST MOD 30 MIN: CPT

## 2023-05-31 NOTE — PROCEDURE
[No] : not [NSR] : normal sinus rhythm [Pacemaker] : pacemaker [AAIR] : AAIR [Voltage: ___ volts] : Voltage was [unfilled] volts [Longevity: ___ months] : The estimated remaining battery life is [unfilled] months [Lead Imp:  ___ohms] : lead impedance was [unfilled] ohms [Sensing Amplitude ___mv] : sensing amplitude was [unfilled] mv [___V @] : [unfilled] V [___ ms] : [unfilled] ms [None] : none [Programmed for Longevity] : output reprogrammed for improved battery longevity [de-identified] : 74bpm [de-identified] : [employer]TRONIC [de-identified] : ADVISA [de-identified] : [id number] [de-identified] : 11-14-14 [de-identified] : with MVP [de-identified] :  [de-identified] : AP: 11.2% // : <0.1%\par 1 high v-rate episode c/w SVT with avg. v-rate = 170bpm lasting upto 3 seconds.\par Transmitting on [facility].\par Some short v-v intervals noted on device history, however after testing there is no question of lead integrity.

## 2023-05-31 NOTE — ASSESSMENT
[FreeTextEntry1] : ## Sinus Node Dysfunction s/p DC-PPM (MDT, 14, Non-dep)\par ## paroxysmal atrial fibrillation \par \par - PPM interrogation shows normally functioning DC-PPM. Battery life ok. No new events. Multiple short V-V events. Minimal V pacing. AP 8%. Sensitive to RV pacing.\par - - On Xarelto. Compliant. No bleeding issues. Patient to contact us if there is any bleeding issues, interruption or any issues with OAC. Patient to go to ER/call 911 if any major bleeding. \par - We discussed the possibility of discontinuing anticoagulation and discussed the risks and benefits in detail. After a detailed discussion, we decided to continue anticoagulation for now. \par - Remote monitoring.\par - Return in 6 months. \par

## 2023-05-31 NOTE — ADDENDUM
[FreeTextEntry1] : Lien VARGAS assisted in documentation on 05/31/2023 acting as a scribe for Dr. Terence Aldrich.\par

## 2023-05-31 NOTE — HISTORY OF PRESENT ILLNESS
[de-identified] : The patient noted the following symptom(s):. Ms. DOHERTY is a 86 year-year old female with history of sinus arrest s/p DC-PPM (MDT, 14, Non-dep), ? paroxysmal atrial fibrillation, DL, HTN, Hypothyroidism, parkinsons is here to establish care for device checks and Atrial Fibrillation.\par \par She is accompanied by her . \par \par No recent episodes of AF. They are unaware of any AF episodes in past.\par \par 11/30/22: Feels fine\par 05/31/2023: Feels fine. Accompanied by her son. Occasional palpitations. \par \par Denies chest pain, shortness of breath, palpitation, dizziness or LOC except noted above.\par \par EKG (05/31/2023): AP @ 73\par EKG (5/25): SR@ 69, , QRSd 86\par EKG(11/30/22): SR\par Cardio: Dr. Phelan. \par

## 2023-06-01 PROBLEM — Z95.0 PRESENCE OF PERMANENT CARDIAC PACEMAKER: Status: ACTIVE | Noted: 2020-08-28

## 2023-06-01 PROBLEM — I49.5 SICK SINUS SYNDROME DUE TO SA NODE DYSFUNCTION: Status: ACTIVE | Noted: 2020-08-28

## 2023-06-01 PROBLEM — I10 ESSENTIAL (PRIMARY) HYPERTENSION: Status: ACTIVE | Noted: 2020-10-05

## 2023-06-01 PROBLEM — E78.5 HYPERLIPIDEMIA, UNSPECIFIED HYPERLIPIDEMIA TYPE: Status: ACTIVE | Noted: 2020-08-28

## 2023-06-01 PROBLEM — I20.9 ANGINA, CLASS II: Status: ACTIVE | Noted: 2022-12-01

## 2023-06-01 PROBLEM — I48.0 PAROXYSMAL ATRIAL FIBRILLATION: Status: ACTIVE | Noted: 2020-08-28

## 2023-08-31 ENCOUNTER — APPOINTMENT (OUTPATIENT)
Dept: CARDIOLOGY | Facility: CLINIC | Age: 88
End: 2023-08-31

## 2023-09-05 ENCOUNTER — APPOINTMENT (OUTPATIENT)
Dept: CARDIOLOGY | Facility: CLINIC | Age: 88
End: 2023-09-05
Payer: MEDICARE

## 2023-09-05 ENCOUNTER — NON-APPOINTMENT (OUTPATIENT)
Age: 88
End: 2023-09-05

## 2023-09-05 PROCEDURE — 93296 REM INTERROG EVL PM/IDS: CPT

## 2023-09-05 PROCEDURE — 93294 REM INTERROG EVL PM/LDLS PM: CPT

## 2023-10-04 ENCOUNTER — APPOINTMENT (OUTPATIENT)
Dept: CARDIOLOGY | Facility: CLINIC | Age: 88
End: 2023-10-04
Payer: MEDICARE

## 2023-10-04 PROCEDURE — 99214 OFFICE O/P EST MOD 30 MIN: CPT

## 2023-10-04 PROCEDURE — 93000 ELECTROCARDIOGRAM COMPLETE: CPT

## 2023-10-04 RX ORDER — RIVAROXABAN 20 MG/1
20 TABLET, FILM COATED ORAL
Qty: 90 | Refills: 3 | Status: ACTIVE | COMMUNITY
Start: 2020-09-18 | End: 1900-01-01

## 2023-10-25 RX ORDER — NITROGLYCERIN 0.3 MG/1
0.3 TABLET SUBLINGUAL
Qty: 90 | Refills: 1 | Status: ACTIVE | COMMUNITY
Start: 1900-01-01 | End: 1900-01-01

## 2023-11-13 ENCOUNTER — OUTPATIENT (OUTPATIENT)
Dept: OUTPATIENT SERVICES | Facility: HOSPITAL | Age: 88
LOS: 1 days | End: 2023-11-13
Payer: MEDICARE

## 2023-11-13 ENCOUNTER — RESULT REVIEW (OUTPATIENT)
Age: 88
End: 2023-11-13

## 2023-11-13 DIAGNOSIS — Z00.8 ENCOUNTER FOR OTHER GENERAL EXAMINATION: ICD-10-CM

## 2023-11-13 DIAGNOSIS — R07.9 CHEST PAIN, UNSPECIFIED: ICD-10-CM

## 2023-11-13 DIAGNOSIS — Z95.0 PRESENCE OF CARDIAC PACEMAKER: Chronic | ICD-10-CM

## 2023-11-13 PROCEDURE — 75574 CT ANGIO HRT W/3D IMAGE: CPT

## 2023-11-13 PROCEDURE — 75574 CT ANGIO HRT W/3D IMAGE: CPT | Mod: 26,MH

## 2023-11-14 DIAGNOSIS — R07.9 CHEST PAIN, UNSPECIFIED: ICD-10-CM

## 2023-11-28 ENCOUNTER — APPOINTMENT (OUTPATIENT)
Dept: CARDIOLOGY | Facility: CLINIC | Age: 88
End: 2023-11-28
Payer: MEDICARE

## 2023-11-28 PROCEDURE — 99214 OFFICE O/P EST MOD 30 MIN: CPT

## 2023-11-29 RX ORDER — METOPROLOL SUCCINATE 25 MG/1
25 TABLET, EXTENDED RELEASE ORAL DAILY
Qty: 90 | Refills: 3 | Status: ACTIVE | COMMUNITY
Start: 1900-01-01 | End: 1900-01-01

## 2023-11-29 RX ORDER — NITROGLYCERIN 20 MG/1
0.1 PATCH TRANSDERMAL
Qty: 90 | Refills: 0 | Status: ACTIVE | COMMUNITY
Start: 2022-11-25

## 2023-12-20 ENCOUNTER — APPOINTMENT (OUTPATIENT)
Dept: ELECTROPHYSIOLOGY | Facility: CLINIC | Age: 88
End: 2023-12-20

## 2024-03-25 ENCOUNTER — NON-APPOINTMENT (OUTPATIENT)
Age: 89
End: 2024-03-25

## 2024-03-25 ENCOUNTER — APPOINTMENT (OUTPATIENT)
Dept: CARDIOLOGY | Facility: CLINIC | Age: 89
End: 2024-03-25
Payer: SELF-PAY

## 2024-03-25 PROCEDURE — 93294 REM INTERROG EVL PM/LDLS PM: CPT

## 2024-03-25 PROCEDURE — 93296 REM INTERROG EVL PM/IDS: CPT

## 2024-04-01 NOTE — ED ADULT NURSE NOTE - CCCP TRG CHIEF CMPLNT
Social work / Discharge planning:          Social work met with patient's daughter Radha at bedside for initial assessment while patient was sleeping.     He lives alone in a one story home with three steps to enter.      Patient has a ww and shower seat.     He had a recent stay at [facility] and is active with [facility] .     AM PAC 17/24.     Patient's daughter requested a referral to [facility] and declined freedom of choice list.     Referral made to liaison.    Awaiting response.    Electronically signed by FLORIAN Zazueta on 4/1/2024 at 1:57 PM    chest pain

## 2024-04-16 ENCOUNTER — APPOINTMENT (OUTPATIENT)
Dept: CARDIOLOGY | Facility: CLINIC | Age: 89
End: 2024-04-16

## 2024-05-04 ENCOUNTER — EMERGENCY (EMERGENCY)
Facility: HOSPITAL | Age: 89
LOS: 0 days | Discharge: HOME | End: 2024-05-04
Attending: EMERGENCY MEDICINE
Payer: MEDICARE

## 2024-05-04 VITALS
OXYGEN SATURATION: 97 % | HEART RATE: 62 BPM | DIASTOLIC BLOOD PRESSURE: 84 MMHG | TEMPERATURE: 98 F | RESPIRATION RATE: 18 BRPM | SYSTOLIC BLOOD PRESSURE: 178 MMHG

## 2024-05-04 DIAGNOSIS — K21.9 GASTRO-ESOPHAGEAL REFLUX DISEASE WITHOUT ESOPHAGITIS: ICD-10-CM

## 2024-05-04 DIAGNOSIS — M25.531 PAIN IN RIGHT WRIST: ICD-10-CM
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
## 2024-05-04 DIAGNOSIS — Z95.0 PRESENCE OF CARDIAC PACEMAKER: Chronic | ICD-10-CM

## 2024-05-04 DIAGNOSIS — E03.9 HYPOTHYROIDISM, UNSPECIFIED: ICD-10-CM
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
## 2024-05-04 DIAGNOSIS — I48.91 UNSPECIFIED ATRIAL FIBRILLATION: ICD-10-CM

## 2024-05-04 DIAGNOSIS — S52.501A UNSPECIFIED FRACTURE OF THE LOWER END OF RIGHT RADIUS, INITIAL ENCOUNTER FOR CLOSED FRACTURE: ICD-10-CM

## 2024-05-04 DIAGNOSIS — E78.5 HYPERLIPIDEMIA, UNSPECIFIED: ICD-10-CM

## 2024-05-04 DIAGNOSIS — Z79.01 LONG TERM (CURRENT) USE OF ANTICOAGULANTS: ICD-10-CM

## 2024-05-04 PROCEDURE — 73090 X-RAY EXAM OF FOREARM: CPT | Mod: 26,RT

## 2024-05-04 PROCEDURE — 99284 EMERGENCY DEPT VISIT MOD MDM: CPT | Mod: FS,57

## 2024-05-04 PROCEDURE — 73110 X-RAY EXAM OF WRIST: CPT | Mod: RT

## 2024-05-04 PROCEDURE — 73130 X-RAY EXAM OF HAND: CPT | Mod: 26,RT

## 2024-05-04 PROCEDURE — 29125 APPL SHORT ARM SPLINT STATIC: CPT | Mod: RT

## 2024-05-04 PROCEDURE — 99284 EMERGENCY DEPT VISIT MOD MDM: CPT | Mod: 25

## 2024-05-04 PROCEDURE — 73110 X-RAY EXAM OF WRIST: CPT | Mod: 26,RT

## 2024-05-04 PROCEDURE — 73090 X-RAY EXAM OF FOREARM: CPT | Mod: RT

## 2024-05-04 PROCEDURE — 25600 CLTX DST RDL FX/EPHYS SEP WO: CPT | Mod: 54,RT

## 2024-05-04 PROCEDURE — 73130 X-RAY EXAM OF HAND: CPT | Mod: RT

## 2024-05-04 NOTE — ED PROVIDER NOTE - ATTENDING APP SHARED VISIT CONTRIBUTION OF CARE
88-year-old female past med history of A-fib on Xarelto, hypertension, GERD, hypothyroid presents after mechanical fall.  Patient states that she was leaning over to grab something from the floor when she slipped and fell.  States that she braced with her hands.  Denies any head trauma or other injuries.  Now having pain to the right wrist.  Took some Tylenol prior to arrival.  Adamantly denies hitting her head.  No LOC.  No nausea vomiting.  No neck or back pain.  Ambulating normally afterwards.    CONSTITUTIONAL: Well-developed; well-nourished; in no acute distress.   SKIN: warm, dry  HEAD: Normocephalic; atraumatic.  EYES: PERRL, EOMI, no conjunctival erythema  ENT: No nasal discharge; airway clear.  NECK: Supple; non tender.  CARD: S1, S2 normal;  Regular rate and rhythm.   RESP: No wheezes, rales or rhonchi.  ABD: soft non tender, non distended, no rebound or guarding  EXT: Normal ROM.  5/5 strength in all 4 extremities.  Positive edema, tenderness and deformity to the distal right forearm and wrist.  Neurovascular intact.  2+ pulses.  LYMPH: No acute cervical adenopathy.  NEURO: Alert, oriented, grossly unremarkable. neurovascularly intact  PSYCH: Cooperative, appropriate.

## 2024-05-04 NOTE — ED PROVIDER NOTE - NSFOLLOWUPINSTRUCTIONS_ED_ALL_ED_FT
Please make sure to follow up with your primary care doctor in 3 days.    Our Emergency Department Referral Coordinators will be reaching out ot you in the next 24-48 hours from 9:00am to 5:00pm (Monday to Friday) with a follow up appointment. Please expect a phone call from the hospital in that time frame. If you do not receive a call or if you have any questions or concerns, you can reach them at [phone].        Radial Fracture    Bones of the arm and hand. There is a break, or fracture, in the radius.   A radial fracture is a break in the radius bone. The radius is a bone in the forearm, on the same side as the thumb. The forearm is the part of the arm that is between the elbow and the wrist. A radial fracture near the wrist (distal radialfracture) is the most common type of broken arm. A fracture can also occur near the elbow (radial head fracture).      What are the causes?    The most common cause of a radial fracture is falling with the arm outstretched. Other causes include:  •An accident, such as a car or bike accident.      •A hard, direct hit to the forearm.        What increases the risk?    You may be at greater risk for a radial fracture if you:  •Are female.      •Are an older adult.      •Play contact sports.      •Have a condition that causes your bones to become thin and brittle (osteoporosis).        What are the signs or symptoms?    A radial fracture causes pain immediately after the injury. Other signs and symptoms may include:  •An abnormal bend or bump in the arm (deformity).      •Swelling.      •Tenderness.      •Bruising.      •Numbness or tingling in your arm and hand.      •Limited movement of your arm and hand.      •Pain when trying to move your wrist, hand, or elbow.        How is this diagnosed?    This condition may be diagnosed based on:  •Your symptoms and medical history.      •A physical exam.      •An X-ray.        How is this treated?    Treatment depends on how severe your fracture is, where it is, and how the pieces of the broken bone line up with each other (alignment).  •Initially, you may need to wear a temporary splint to stabilize the injury for a few days until your swelling goes down. After the swelling goes down, you may get a cast, get a different type of splint, or have surgery.    •If your broken bone is not aligned (displaced) or significantly involves other joints (intra-articular fracture), your health care provider will need to align the bone pieces. To align your broken bone, your health care provider may:  •Move the bones back into position without surgery (closed reduction).      •Perform surgery to align the fracture and fix the bone pieces into place with metal screws, plates, or wires (open reduction and internal fixation).      •Perform surgery to align the fracture and fix the bone pieces into place with pins that are attached to a stabilizing bar outside your skin (external fixation).        •If there is a cut (laceration) in the skin over the fracture, this may indicate a compound fracture. You may need to take antibiotic medicines and have surgery to clean out the wound and prevent infection of the bones.      Treatment may also include:  •Wearing a splint or cast. This keeps your wrist in place (immobilizes) and allows the fractured bone to heal properly.      •Having your cast changed after 2–3 weeks.      •Physical therapy exercises to improve movement and strength in your arm.      •Follow-up visits and X-rays to make sure you are healing.        Follow these instructions at home:    If you have a removable splint:     •Wear the splint as told by your health care provider. Remove it only as told by your health care provider.      •Check the skin around the splint every day. Tell your health care provider about any concerns.      •Loosen the splint if your fingers tingle, become numb, or turn cold and blue.       •Keep the splint clean and dry.      If you have a nonremovable cast or splint:     • Do not put pressure on any part of the cast or splint until it is fully hardened. This may take several hours.      • Do not stick anything inside the cast or splint to scratch your skin. Doing that increases your risk of infection.      •Check the skin around the cast or splint every day. Tell your health care provider about any concerns.      •You may put lotion on dry skin around the edges of the cast or splint. Do not put lotion on the skin underneath the cast or splint.      •Keep it clean and dry.      Bathing     • Do not take baths, swim, or use a hot tub until your health care provider approves. Ask your health care provider if you may take showers. You may only be allowed to take sponge baths.    •If your splint or cast is not waterproof:  •Do not let it get wet.      •Cover it with a watertight covering when you take a bath or a shower.          Managing pain, stiffness, and swelling   Bag of ice on a towel on the skin.  •If directed, put ice on the painful area. To do this:  •If you have a removable splint, remove it as told by your health care provider.      •Put ice in a plastic bag.      •Place a towel between your skin and the bag, or between your cast or splint and the bag.      •Leave the ice on for 20 minutes, 2–3 times a day.      •Remove the ice if your skin turns bright red. This is very important. If you cannot feel pain, heat, or cold, you have a greater risk of damage to the area.        •Move your fingers often to reduce stiffness and swelling.      •Raise (elevate) your arm above the level of your heart while you are sitting or lying down.      Activity     •  Do not lift anything with your injured arm.      • Do not use the injured arm to support your body weight until your health care provider says that you can.      •Ask your health care provider what activities are safe for you and what activities you should avoid while you heal.      •Do exercises as told by your health care provider or physical therapist.      Driving     Ask your health care provider:  •If the medicine prescribed to you requires you to avoid driving or using machinery.      •When it is safe to drive if you have a splint or cast on your arm.      General instructions    •Take over-the-counter and prescription medicines only as told by your health care provider.      •If you were prescribed an antibiotic medicine, take it as told by your health care provider. Do not stop using the antibiotic even if you start to feel better.      •Do not use any products that contain nicotine or tobacco. These products include cigarettes, chewing tobacco, and vaping devices, such as e-cigarettes. These can delay bone healing. If you need help quitting, ask your health care provider.      •Keep all follow-up visits. This is important.        Contact a health care provider if you have:    •Pain that does not get better with medicine.      •Swelling that gets worse.      •A bad smell coming from your cast.        Get help right away if:    •You cannot move your fingers.      •You have severe pain, especially if the pain changes significantly or suddenly.    •Your fingers or your hand:  •Become numb, cold, or pale.      •Turn a bluish color.          Summary    •A radial fracture is a break in the radius bone.      •The most common cause is falling on an outstretched hand. Treatment depends on how severe your fracture is, where it is, and how the pieces of the broken bone line up with each other.      •A splint or cast may be needed to help the fracture heal. A more severe fracture may require surgery.      This information is not intended to replace advice given to you by your health care provider. Make sure you discuss any questions you have with your health care provider.

## 2024-05-04 NOTE — ED PROVIDER NOTE - PHYSICAL EXAMINATION
CONSTITUTIONAL: in no apparent distress.   HEAD: Normocephalic; atraumatic.   EYES: Pupils are round and reactive, extra-ocular muscles are intact. Eyelids are normal in appearance without swelling or lesions.   ENT: Hearing is intact with good acuity to spoken voice.  Patient is speaking clearly, not muffled and airway is intact.   RESPIRATORY: No signs of respiratory distress. Lung sounds are clear in all lobes bilaterally without rales, rhonchi, or wheezes.  CARDIOVASCULAR: Regular rate and rhythm.   GI: Abdomen is soft, non-tender, and without distention. Bowel sounds are present and normoactive in all four quadrants. No masses are noted.   BACK: No evidence of trauma or deformity. No midline tenderness. Normal ROM.   MS: R wrist noticed with deformity and swelling; tender to palpation; limited ROM; sensory function intact; distal pulse present. Rest of the upper and lower extremities unremarkable. Steady gait noted.   NEURO: A & O x 3. Normal speech. No focal deficit.  PSYCHOLOGICAL: Appropriate mood and affect. Good judgement and insight.

## 2024-05-04 NOTE — ED PROVIDER NOTE - OBJECTIVE STATEMENT
88-year-old female with a past medical history of A-fib on Xarelto, hyperlipidemia, hypothyroidism who presents to the ED with a right wrist pain status fall that occurred prior to arrival.  Reports that she leaned over to tie her shoelace when she lost balance and fell forward onto her right wrist.  Denies head/neck injury, LOC, and anticoagulant use.  Reports the pain is constant and worse with movement.  Denies pain or discomfort or injury elsewhere.

## 2024-05-04 NOTE — ED ADULT NURSE NOTE - CAS EDP DISCH TYPE
Per patient, tubal ligation on March 8th, wound to left side has been having white drainage. Wound to right side no drainage and intact.  No bleeding noted to site. patient denies swelling but redness present. Patient rated pain at 1.      [id number] used for intake.   Home

## 2024-05-04 NOTE — ED PROCEDURE NOTE - NS ED ATTENDING STATEMENT MOD
Follow up with [facility] burn center in [place] tomorrow 7am. NPO after midnight, You are to go to 6th floor of main campus.     Tylenol/motrin as needed for pain   
This was a shared visit with the BRYAN. I reviewed and verified the documentation.

## 2024-05-04 NOTE — ED PROVIDER NOTE - CLINICAL SUMMARY MEDICAL DECISION MAKING FREE TEXT BOX
Patient presents after mechanical fall.  Positive right wrist pain.  X-rays done which show a distal radial fracture.  Fracture is nondisplaced.  Splint applied.  Results discussed.  Discharged with orthopedic follow-up and return precautions.

## 2024-05-04 NOTE — ED PROVIDER NOTE - PATIENT PORTAL LINK FT
You can access the FollowMyHealth Patient Portal offered by [facility] by registering at the following website: [email or web address]. By joining [employer]’s FollowMyHealth portal, you will also be able to view your health information using other applications (apps) compatible with our system.

## 2024-05-04 NOTE — ED PROVIDER NOTE - CARE PROVIDER_API CALL
Ovidio Turner  Orthopaedic Surgery  [address] [place] [address]  Phone: [phone]  Fax: [phone]  Follow Up Time: 1-3 Days

## 2024-05-04 NOTE — ED PROVIDER NOTE - PROGRESS NOTE DETAILS
X-ray shows distal radial fracture and possible radial shaft fracture.  Splint applied and the patient is stable for discharge.  Will have patient follow-up with Ortho outpatient.

## 2024-05-06 ENCOUNTER — APPOINTMENT (OUTPATIENT)
Dept: ORTHOPEDIC SURGERY | Facility: CLINIC | Age: 89
End: 2024-05-06
Payer: MEDICARE

## 2024-05-06 VITALS — HEIGHT: 60 IN | BODY MASS INDEX: 22.58 KG/M2 | WEIGHT: 115 LBS

## 2024-05-06 VITALS — BODY MASS INDEX: 31.41 KG/M2 | WEIGHT: 160 LBS | HEIGHT: 60 IN

## 2024-05-06 DIAGNOSIS — Z95.0 PRESENCE OF CARDIAC PACEMAKER: ICD-10-CM

## 2024-05-06 DIAGNOSIS — S52.501A UNSPECIFIED FRACTURE OF THE LOWER END OF RIGHT RADIUS, INITIAL ENCOUNTER FOR CLOSED FRACTURE: ICD-10-CM

## 2024-05-06 DIAGNOSIS — S52.601A UNSPECIFIED FRACTURE OF THE LOWER END OF RIGHT RADIUS, INITIAL ENCOUNTER FOR CLOSED FRACTURE: ICD-10-CM

## 2024-05-06 PROCEDURE — 29075 APPL CST ELBW FNGR SHORT ARM: CPT | Mod: RT

## 2024-05-06 PROCEDURE — 99203 OFFICE O/P NEW LOW 30 MIN: CPT | Mod: 25

## 2024-05-06 NOTE — IMAGING
[de-identified] : On examination of her right wrist, the splint was removed.  She has some swelling of the wrist.  She has a chronic deformity.  She is tender to palpation over the distal radius and the distal ulna.  Tenderness over the snuffbox or the metacarpals.  No tenderness to the fingers.  No tenderness over the proximal forearm or the elbow.  She has full range of motion of the elbow.  She is able to open and close her fist.  Sensation is intact to all the fingers.  2+ radial pulse.  X-rays taken at the hospital reviewed in the office today show a nondisplaced distal radius fracture, there is also an ulnar styloid fracture.  No other fractures or dislocations noted.

## 2024-05-06 NOTE — HISTORY OF PRESENT ILLNESS
[de-identified] : 88-year-old female is here today for evaluation of her right wrist.  Patient tripped and fell on Saturday injuring her wrist.  They went to the hospital, she was told she had a fracture and she was placed in a sugar-tong splint.  They recommended she follow-up here.  She states she is very uncomfortable in the splint.  She had a previous fracture to this wrist about 10 years ago and had a chronic deformity since then. She denies any numbness or tingling in the hand or fingers.

## 2024-05-06 NOTE — DISCUSSION/SUMMARY
[de-identified] : At this time she was placed in a well-fitted well molded short arm cast.  Patient tolerated the procedure well.  She was much more comfortable in the cast than the splint.  I discussed with her and her family she cannot get the cast wet.  Tylenol as needed for pain.  I sent a few days of pain medication to take as needed.  I discussed with patient's family member that it may make her drowsy so she should just take it at nighttime.  She cannot take anti-inflammatories because she is on a blood thinner.  We will see her back in 2 weeks for repeat x-rays and evaluation. Patient will call me if any other problems or concerns.  Patient verbalized understanding and agreed with the plan, all questions were answered in the office today.

## 2024-05-24 ENCOUNTER — APPOINTMENT (OUTPATIENT)
Dept: ORTHOPEDIC SURGERY | Facility: CLINIC | Age: 89
End: 2024-05-24
Payer: MEDICARE

## 2024-05-24 ENCOUNTER — TRANSCRIPTION ENCOUNTER (OUTPATIENT)
Age: 89
End: 2024-05-24

## 2024-05-24 DIAGNOSIS — S52.551D OTHER EXTRAARTICULAR FRACTURE OF LOWER END OF RIGHT RADIUS, SUBSEQUENT ENCOUNTER FOR CLOSED FRACTURE WITH ROUTINE HEALING: ICD-10-CM

## 2024-05-24 PROCEDURE — L3908: CPT | Mod: RT

## 2024-05-24 PROCEDURE — 73110 X-RAY EXAM OF WRIST: CPT | Mod: RT

## 2024-05-24 PROCEDURE — 99202 OFFICE O/P NEW SF 15 MIN: CPT

## 2024-05-24 NOTE — HISTORY OF PRESENT ILLNESS
[de-identified] : 88-year-old female had a fall resulting in a right wrist fracture.  She comes in today for evaluation.  The injury is about 3 weeks old.  She is in a cast.  She is tolerating the cast.

## 2024-05-24 NOTE — PHYSICAL EXAM
[de-identified] : Patient has good range of motion of the fingers.  She has resolving ecchymoses.  Her cast was removed she has mild tenderness at the fracture site.  She has some pain with flexion extension and rotation.  Mild deformity

## 2024-05-24 NOTE — DATA REVIEWED
[FreeTextEntry1] : Radiographs 3 views of the right wrist reviewed showing good position alignment and early healing of a right-sided distal radius fracture.

## 2024-05-24 NOTE — ASSESSMENT
[FreeTextEntry1] : Patient is a healing right-sided distal radius fracture.  The cast will come off today.  She will go into removable brace.  She will see me back in the office back in 3 weeks with a repeat radiograph of the right wrist.  Use of the brace was discussed with the patient and her son.

## 2024-06-20 ENCOUNTER — APPOINTMENT (OUTPATIENT)
Dept: ORTHOPEDIC SURGERY | Facility: CLINIC | Age: 89
End: 2024-06-20

## 2024-07-01 ENCOUNTER — APPOINTMENT (OUTPATIENT)
Dept: CARDIOLOGY | Facility: CLINIC | Age: 89
End: 2024-07-01

## 2024-07-12 ENCOUNTER — APPOINTMENT (OUTPATIENT)
Dept: CARDIOLOGY | Facility: CLINIC | Age: 89
End: 2024-07-12

## 2024-07-16 NOTE — ED PROVIDER NOTE - NSTIMEPROVIDERCAREINITIATE_GEN_ER
10-Armen-2022 20:49 Augustina Cande   73 y.o.  female  @location@            Assessment and Plan:  History and physical    Moderate to advanced dementia  Past history of stroke  Hypertensive heart disease  CKD stage III  Type 2 diabetes  Paroxysmal A-fib  Osteoporosis    Continue alendronate for osteoporosis    Anticoagulated for A-fib with Eliquis.  Monitor for bleeding    On aspirin for past history of stroke.  Monitor neurological status    Hyperlipidemia-on Lipitor.  Monitor lipid panel    Continue memantine for dementia    Anxiety and depression-on Zoloft    Hypertension-controlled with Cardizem.  Monitor and treat accordingly    I discussed advanced care planning for more than 16 minutes including the explanation and discussion of advanced directives. If patient does not have current up to date documents, examples and information provided on how to create both living will and power of . Patient was encouraged to work on completing these documents.  Information and advise was also provided on DO NOT RESUSCITATE and patient encouraged to consider this  Patient is not sure about DNR at this time.  CODE STATUS is on file with the facility      Source of history: Nurse, Medical personnel, Medical record, Patient.  History limitation: None.    HPI:  History and physical    Patient is unable to give any detailed history and therefore history is obtained from the chart  No acute complaints voiced by the patient or acute concerns raised by nursing    Problem List/Past Medical History Ongoing Degenerative disc disease at L5-S1 level Hyperlipidemia Hypertension Iron deficiency anemia Mild renal insufficiency Osteopenia of hip Postmenopausal status Type 2 diabetes mellitus Historical   No qualifying data Procedure/Surgical History Right carotid endarterectomy: 01/01/10  Cataracts removed bilaterally Medications alendronate 70 mg oral tablet, 70 mg= 1 tabs, ORAL, TUESDAY aspirin 81 mg oral tablet, DAILY Caltrate, ORAL, DAILY Cartia XT  240 mg/24 hours oral capsule, extended release, 240 mg= 1 caps, ORAL, DAILY, 1 refills Fish Oil oral capsule, DAILY Glucometer test strips for BID testing., See Instructions, 11 refills Lipitor 40 mg oral tablet, 40 mg= 1 tabs, ORAL, DAILY, 1 refills metFORMIN 500 mg oral tablet, 1 tabs, ORAL, BID multivitamin, ORAL, DAILY Vitamin B-12, ORAL, DAILY Vitamin D, ORAL, DAILY Allergies No Known Allergies Social History   Alcohol - Denies Alcohol Use, 11/21/2017   Domestic Concerns   No, 11/21/2017   Employment/School   Retired, Work/School description: ., 11/21/2017   Exercise   Exercise type: Aerobics., 11/21/2017   Home/Environment   Lives with Siblings. Living situation: Home/Independent. Home equipment: None, Blood pressure monitor. Home monitoring equipment: None. Special/Community resources: None. Mobility prior to admit: Independent. Home Barriers: None. Will patient require additional/new services upon discharge? No., 11/21/2017    Family History Alcoholism: Father. Alzheimer disease: Mother and Grandmother. Diabetes..: Mother. High blood pressure....: Brother. Immunizations   Vaccine Date Status Comments   pneumococcal 13-valent vaccine 03/16/2020 Given    influenza virus vaccine, inactivated 11/22/2019 Recorded    influenza virus vaccine, inactivated 11/20/2018 Recorded    diphtheria/pertussis, acel/tetanus adult 05/21/2018 Given exp date not right in system, exp date is June 13, 2020   influenza virus vaccine, inactivated 11/21/2017 Given    influenza virus vaccine, inactivated 12/19/2014 Recorded    .   Physical Exam:  Vital signs as per nursing/MA documentation were reviewed  General appearance: Alert and in no acute distress  HEENT: Normal Inspection  Neck - Normal Inspection  Respiratory : No respiratory distress. Lungs are clear   Cardiovascular: heart rate normal. No gallop  Back - normal inspection  Skin inspection:Warm  Musculoskeletal : No deformities  Neuro : Limited exam. Baseline    ROS:  Review of symptoms is negative except for what is mentioned in HPI    Results/Data  Records including but not limited to electronic medical records, relevant lab work and imaging from patient's health care facility encounter were reviewed and independently verified      Charting was completed using voice recognition technology and may include unintended errors.    Discussed with patient/family, RN, and NP.

## 2024-07-27 ENCOUNTER — INPATIENT (INPATIENT)
Facility: HOSPITAL | Age: 89
LOS: 2 days | Discharge: HOME | DRG: 149 | End: 2024-07-30
Attending: INTERNAL MEDICINE | Admitting: STUDENT IN AN ORGANIZED HEALTH CARE EDUCATION/TRAINING PROGRAM
Payer: MEDICARE

## 2024-07-27 VITALS
DIASTOLIC BLOOD PRESSURE: 77 MMHG | RESPIRATION RATE: 18 BRPM | SYSTOLIC BLOOD PRESSURE: 123 MMHG | HEART RATE: 63 BPM | OXYGEN SATURATION: 99 % | WEIGHT: 149.91 LBS | TEMPERATURE: 98 F

## 2024-07-27 DIAGNOSIS — Z95.0 PRESENCE OF CARDIAC PACEMAKER: Chronic | ICD-10-CM

## 2024-07-27 LAB
ALBUMIN SERPL ELPH-MCNC: 4.1 G/DL — SIGNIFICANT CHANGE UP (ref 3.5–5.2)
ALP SERPL-CCNC: 51 U/L — SIGNIFICANT CHANGE UP (ref 30–115)
ALT FLD-CCNC: 7 U/L — SIGNIFICANT CHANGE UP (ref 0–41)
ANION GAP SERPL CALC-SCNC: 9 MMOL/L — SIGNIFICANT CHANGE UP (ref 7–14)
APPEARANCE UR: CLEAR — SIGNIFICANT CHANGE UP
AST SERPL-CCNC: 18 U/L — SIGNIFICANT CHANGE UP (ref 0–41)
BACTERIA # UR AUTO: NEGATIVE /HPF — SIGNIFICANT CHANGE UP
BASOPHILS # BLD AUTO: 0.02 K/UL — SIGNIFICANT CHANGE UP (ref 0–0.2)
BASOPHILS NFR BLD AUTO: 0.4 % — SIGNIFICANT CHANGE UP (ref 0–1)
BILIRUB SERPL-MCNC: 0.4 MG/DL — SIGNIFICANT CHANGE UP (ref 0.2–1.2)
BILIRUB UR-MCNC: NEGATIVE — SIGNIFICANT CHANGE UP
BUN SERPL-MCNC: 15 MG/DL — SIGNIFICANT CHANGE UP (ref 10–20)
CALCIUM SERPL-MCNC: 9.1 MG/DL — SIGNIFICANT CHANGE UP (ref 8.4–10.4)
CAST: 1 /LPF — SIGNIFICANT CHANGE UP (ref 0–4)
CHLORIDE SERPL-SCNC: 101 MMOL/L — SIGNIFICANT CHANGE UP (ref 98–110)
CO2 SERPL-SCNC: 26 MMOL/L — SIGNIFICANT CHANGE UP (ref 17–32)
COLOR SPEC: YELLOW — SIGNIFICANT CHANGE UP
CREAT SERPL-MCNC: 0.8 MG/DL — SIGNIFICANT CHANGE UP (ref 0.7–1.5)
DIFF PNL FLD: NEGATIVE — SIGNIFICANT CHANGE UP
EGFR: 71 ML/MIN/1.73M2 — SIGNIFICANT CHANGE UP
EOSINOPHIL # BLD AUTO: 0.07 K/UL — SIGNIFICANT CHANGE UP (ref 0–0.7)
EOSINOPHIL NFR BLD AUTO: 1.3 % — SIGNIFICANT CHANGE UP (ref 0–8)
GLUCOSE SERPL-MCNC: 94 MG/DL — SIGNIFICANT CHANGE UP (ref 70–99)
GLUCOSE UR QL: NEGATIVE MG/DL — SIGNIFICANT CHANGE UP
HCT VFR BLD CALC: 36.8 % — LOW (ref 37–47)
HGB BLD-MCNC: 12 G/DL — SIGNIFICANT CHANGE UP (ref 12–16)
IMM GRANULOCYTES NFR BLD AUTO: 0.4 % — HIGH (ref 0.1–0.3)
KETONES UR-MCNC: NEGATIVE MG/DL — SIGNIFICANT CHANGE UP
LEUKOCYTE ESTERASE UR-ACNC: ABNORMAL
LYMPHOCYTES # BLD AUTO: 1.18 K/UL — LOW (ref 1.2–3.4)
LYMPHOCYTES # BLD AUTO: 22.6 % — SIGNIFICANT CHANGE UP (ref 20.5–51.1)
MCHC RBC-ENTMCNC: 28.9 PG — SIGNIFICANT CHANGE UP (ref 27–31)
MCHC RBC-ENTMCNC: 32.6 G/DL — SIGNIFICANT CHANGE UP (ref 32–37)
MCV RBC AUTO: 88.7 FL — SIGNIFICANT CHANGE UP (ref 81–99)
MONOCYTES # BLD AUTO: 0.45 K/UL — SIGNIFICANT CHANGE UP (ref 0.1–0.6)
MONOCYTES NFR BLD AUTO: 8.6 % — SIGNIFICANT CHANGE UP (ref 1.7–9.3)
NEUTROPHILS # BLD AUTO: 3.48 K/UL — SIGNIFICANT CHANGE UP (ref 1.4–6.5)
NEUTROPHILS NFR BLD AUTO: 66.7 % — SIGNIFICANT CHANGE UP (ref 42.2–75.2)
NITRITE UR-MCNC: NEGATIVE — SIGNIFICANT CHANGE UP
NRBC # BLD: 0 /100 WBCS — SIGNIFICANT CHANGE UP (ref 0–0)
PH UR: 6 — SIGNIFICANT CHANGE UP (ref 5–8)
PLATELET # BLD AUTO: 184 K/UL — SIGNIFICANT CHANGE UP (ref 130–400)
PMV BLD: 12.3 FL — HIGH (ref 7.4–10.4)
POTASSIUM SERPL-MCNC: 4.6 MMOL/L — SIGNIFICANT CHANGE UP (ref 3.5–5)
POTASSIUM SERPL-SCNC: 4.6 MMOL/L — SIGNIFICANT CHANGE UP (ref 3.5–5)
PROT SERPL-MCNC: 6.3 G/DL — SIGNIFICANT CHANGE UP (ref 6–8)
PROT UR-MCNC: SIGNIFICANT CHANGE UP MG/DL
RBC # BLD: 4.15 M/UL — LOW (ref 4.2–5.4)
RBC # FLD: 14 % — SIGNIFICANT CHANGE UP (ref 11.5–14.5)
RBC CASTS # UR COMP ASSIST: 1 /HPF — SIGNIFICANT CHANGE UP (ref 0–4)
SODIUM SERPL-SCNC: 136 MMOL/L — SIGNIFICANT CHANGE UP (ref 135–146)
SP GR SPEC: 1.02 — SIGNIFICANT CHANGE UP (ref 1–1.03)
SQUAMOUS # UR AUTO: 6 /HPF — HIGH (ref 0–5)
TROPONIN T, HIGH SENSITIVITY RESULT: <6 NG/L — SIGNIFICANT CHANGE UP (ref 6–13)
TROPONIN T, HIGH SENSITIVITY RESULT: <6 NG/L — SIGNIFICANT CHANGE UP (ref 6–13)
UROBILINOGEN FLD QL: 1 MG/DL — SIGNIFICANT CHANGE UP (ref 0.2–1)
WBC # BLD: 5.22 K/UL — SIGNIFICANT CHANGE UP (ref 4.8–10.8)
WBC # FLD AUTO: 5.22 K/UL — SIGNIFICANT CHANGE UP (ref 4.8–10.8)
WBC UR QL: 42 /HPF — HIGH (ref 0–5)

## 2024-07-27 PROCEDURE — 71045 X-RAY EXAM CHEST 1 VIEW: CPT | Mod: 26

## 2024-07-27 PROCEDURE — 99223 1ST HOSP IP/OBS HIGH 75: CPT | Mod: FS,24

## 2024-07-27 RX ORDER — PANTOPRAZOLE SODIUM 20 MG/1
40 TABLET, DELAYED RELEASE ORAL DAILY
Refills: 0 | Status: DISCONTINUED | OUTPATIENT
Start: 2024-07-27 | End: 2024-07-30

## 2024-07-27 RX ORDER — METOPROLOL TARTRATE 100 MG
25 TABLET ORAL DAILY
Refills: 0 | Status: DISCONTINUED | OUTPATIENT
Start: 2024-07-27 | End: 2024-07-28

## 2024-07-27 RX ORDER — ACETAMINOPHEN 500 MG
650 TABLET ORAL ONCE
Refills: 0 | Status: COMPLETED | OUTPATIENT
Start: 2024-07-27 | End: 2024-07-27

## 2024-07-27 RX ORDER — DEXTROSE MONOHYDRATE, SODIUM CHLORIDE, SODIUM LACTATE, CALCIUM CHLORIDE, MAGNESIUM CHLORIDE 1.5; 538; 448; 18.4; 5.08 G/100ML; MG/100ML; MG/100ML; MG/100ML; MG/100ML
500 SOLUTION INTRAPERITONEAL ONCE
Refills: 0 | Status: COMPLETED | OUTPATIENT
Start: 2024-07-27 | End: 2024-07-27

## 2024-07-27 RX ORDER — RIVAROXABAN 15 MG-20MG
20 KIT ORAL
Refills: 0 | Status: DISCONTINUED | OUTPATIENT
Start: 2024-07-27 | End: 2024-07-30

## 2024-07-27 RX ORDER — LEVOTHYROXINE SODIUM 175 MCG
25 TABLET ORAL DAILY
Refills: 0 | Status: DISCONTINUED | OUTPATIENT
Start: 2024-07-27 | End: 2024-07-30

## 2024-07-27 RX ORDER — FAMOTIDINE 40 MG/1
20 TABLET, FILM COATED ORAL DAILY
Refills: 0 | Status: DISCONTINUED | OUTPATIENT
Start: 2024-07-27 | End: 2024-07-30

## 2024-07-27 RX ADMIN — Medication 650 MILLIGRAM(S): at 20:54

## 2024-07-27 RX ADMIN — DEXTROSE MONOHYDRATE, SODIUM CHLORIDE, SODIUM LACTATE, CALCIUM CHLORIDE, MAGNESIUM CHLORIDE 500 MILLILITER(S): 1.5; 538; 448; 18.4; 5.08 SOLUTION INTRAPERITONEAL at 13:29

## 2024-07-27 RX ADMIN — RIVAROXABAN 20 MILLIGRAM(S): KIT at 19:46

## 2024-07-27 NOTE — ED CDU PROVIDER INITIAL DAY NOTE - CLINICAL SUMMARY MEDICAL DECISION MAKING FREE TEXT BOX
Pt w/ near syncope. ED exam w/up neg. Pt stable in ED w/o acute events. Plan for ECHO and reassessment.

## 2024-07-27 NOTE — ED PROVIDER NOTE - CLINICAL SUMMARY MEDICAL DECISION MAKING FREE TEXT BOX
.    88-year-old female, PMH thyroid disorder, A-fib on Eliquis, status post PPM, p/w dizziness/near syncope.  Patient had multiple episodes where she felt lightheaded, warm sensation spread from her head to her feet, close to her eyes, and felt like she would pass out.  Did not actually syncopized.  No recent vomiting, diarrhea, medication changes, head trauma, focal weakness or numbness.  No slurred speech or blurry vision.    Exam as noted above.  Patient is very well-appearing.    Additional information taken from family at the bedside.    Differential diagnosis includes but not limited to vasovagal syncope, cardiogenic syncope, dehydration, electrode abnormality, arrhythmia, valvulopathy    All available lab tests, imaging tests, and EKGs independently reviewed and interpreted by me, Otoniel Storey.  EKG is paced rhythm rate 61, left axis, no STEMI.  Chest x-ray shows no acute process.  PPM interrogated at the bedside.  I spoke with Daniela for [employer], there were no acute events recorded.    IMP: Near syncope.  Will will place in OBS for syncope for echo and further evaluation in the setting of diagnostic uncertainty.    .

## 2024-07-27 NOTE — ED CDU PROVIDER INITIAL DAY NOTE - PHYSICAL EXAMINATION
CONSTITUTIONAL: well-appearing, in NAD  SKIN: Warm dry, normal skin turgor  HEAD: NCAT  EYES: EOMI, PERRLA, no scleral icterus, conjunctiva pink  ENT: normal pharynx with no erythema or exudates  NECK: Supple; non tender. Full ROM.  CARD:  normal rate  RESP: clear to ausculation b/l. No crackles or wheezing.  ABD: soft, non-tender, non-distended, no rebound or guarding.  EXT: Full ROM, no bony tenderness, no pedal edema, no calf tenderness  NEURO: normal motor. normal sensory. CN II-XII intact. Cerebellar testing normal.

## 2024-07-27 NOTE — ED PROVIDER NOTE - PROGRESS NOTE
"[facility]  Obstetrics  Antepartum Progress Note    Patient Name: Emily Mckoen  MRN: [id number]  Admission Date: 2017  Hospital Length of Stay: 2 days  Attending Physician: Iliana Domínguez MD  Primary Care Provider: Primary Doctor No    Subjective:     Principal Problem:Headache in pregnancy    HPI:  Emily Mckeon is a 40 y.o.  at 21w5d who presented to the OB ED from Dr. Domínguez's clinic for a headache not relieved by Fioricet, blurry vision as well as uterine contractions.  The patient states that she has chronic headaches for which she takes Fioricet daily. Generally Fioricet at least relieves that the pain for "a couple hours" per the patient but has not helped today.  With regard to her vision changes she states that she "always has blurry vision" and this is not a change from her baseline.  She denies right upper quadrant pain, nausea or vomiting.  As for her contractions she states that they started this morning and are occurring every 5 minutes.  She denies vaginal bleeding or leakage of fluids. This IUP is complicated by chronic Ha, AMA, h/o of c/s x3, and iron deficiency anemia. In the ED she was given magnesium oxide with no relief of her Ha. P/C ratio 0.17. CBC showed anemia at /276. Of note, patient is in the CHAP trial.     Hospital Course:  2017: Admit to Antepartum for monitor of Bps and HA resolution. Will give patient demerol and phenergran and reassess Ha. P/C 0.13, AST/ALT 40/42, Cr 0.6, platelets 276. BPs normal to mild range (h/o CHTN). Found to be anemic to 7.6/26. Fe supplementation initiated.   2017: Neurology consulted for persistent HA despite multiple medications. Recommended MRI brain without contrast, which was unrevealing. Other recommendations given and employed including scheduled IV steroids, scheduled MgSO4 IV, topomax daily, and prn narcotics. BPs persistently normal to mild range.     Obstetric HPI:  HA still present, but improved. " Notes it is worse with waking up and when she first stands to walk. Overall she feels that it is improved from time of admit. N/V from 2 days ago resolved.   Denies scotoma, CP, SOB, RUQ pain.     Patient reports None contractions, active fetal movement, absent vaginal bleeding , absent loss of fluid      Objective:     Vital Signs (Most Recent):  Temp: 96.6 °F (35.9 °C) (12/01/17 0408)  Pulse: 103 (12/01/17 0408)  Resp: 18 (12/01/17 0408)  BP: 117/77 (12/01/17 0408)  SpO2: 99 % (12/01/17 0408) Vital Signs (24h Range):  Temp:  [96.6 °F (35.9 °C)-97.3 °F (36.3 °C)] 96.6 °F (35.9 °C)  Pulse:  [] 103  Resp:  [17-18] 18  SpO2:  [97 %-100 %] 99 %  BP: (117-152)/(59-82) 117/77     Weight: 72.6 kg (160 lb)  Body mass index is 30.23 kg/m².    FHT: 150 bpm      Intake/Output Summary (Last 24 hours) at 12/01/17 0648  Last data filed at 12/01/17 0550   Gross per 24 hour   Intake          2568.33 ml   Output             1300 ml   Net          1268.33 ml       Cervical Exam: def     Significant Labs:  Recent Lab Results       11/30/17  0839      Ferritin 11(L)     Iron 25(L)     Saturated Iron 4(L)     TIBC 599(H)     Transferrin 405(H)      405(H)           Physical Exam:   Constitutional: She is oriented to person, place, and time. She appears well-developed and well-nourished. No distress.    HENT:   Head: Normocephalic and atraumatic.      Cardiovascular: Normal rate and regular rhythm.     Pulmonary/Chest: Effort normal and breath sounds normal. No respiratory distress.        Abdominal: Soft. She exhibits no distension. There is no tenderness. There is no rebound and no guarding.             Musculoskeletal: Normal range of motion and moves all extremeties. She exhibits no edema or tenderness.       Neurological: She is alert and oriented to person, place, and time. No cranial nerve deficit. She exhibits normal muscle tone.    Skin: Skin is warm and dry.    Psychiatric: She has a normal mood and affect.        Assessment/Plan:     40 y.o. female  at 22w2d for:    * Headache in pregnancy    - At home takes fioricet daily for chronic HA. Given that current HA possibly related to rebound, will stop fioricet   - Neurology consulted for persistent HA, appreciate recommendations:   - MRI Brain without contrast: no acute abnormality   - IV fluids   - Compazine 10 mg daily x 3 days.12.5 mg diphenhydramine to be administered with the Compazine.   - IV opioid, defer to primary team   - Solu-Medrol 1000 mg IV 2 times a day ×3 days   - Topamax 25 mg by mouth daily   - Magnesium sulfate 2 g IV twice a day while hospitalized    - Consider lumbar puncture if MRI is unrevealing with documentation of opening pressure and sending for cell count, glucose and protein.  - Opted to continue toprol daily vs topamax in setting of pregnancy  - HA improving          Normocytic anemia    - H&H 7.6/26.0, MCV 83   - Iron studies c/w PEPITO   - Ferriton low (11), Iron low (25), TIBC elevated (599), saturated iron low (4), transferrin elevated (405)  - continue Fe supplementation  - Hg electrophoresis pending  - VSS, asymptomatic        Chronic hypertension affecting pregnancy    - BP: (117-152)/(59-82) 117/77  - no medications  - P/C 0.13, AST/ALT, Cr, platelets wnl  - do not suspect superimposed pre-eclampsia at this time  - part of CHAP trial        High-risk pregnancy in second trimester    - FHTS q shift  - continue PNV daily              Nubia Yeboah MD  Obstetrics  [facility]           Stable.

## 2024-07-27 NOTE — ED ADULT NURSE NOTE - NSFALLRISKFACTORS_ED_ALL_ED
11/09/2022    Radiation Oncology Follow-Up Visit    Prior Radiation History:    Site  Technique  Energy  Dose/Fx (Gy)  #Fx  Total Dose (Gy)  Start Date  End Date  Elapsed Days    Mediastinum  IMRT  6X  2  30 / 30  60  11/14/2019 12/31/2019  47      Assessment   This is a 73 y.o. y/o female with h/o Stage IIB (pT3 pN0 M0; negative margins) RUL NSCLC (adeno) s/p lobectomy by Dr. Messina 8/9/2018 followed by adjuvant Cis/Alimta with Dr. Dhaliwal completed 1/2019. Surveillance CT Chest 9/17/19 demonstrated uptake in a 1.6 cm area adjacent to staple line next to main airway. This was confirmed recurrent disease by EBUS (9/24/19 with Dr. Hearn) and PET/CT 10/1/19. No evidence of other lung-related disease, though she has persistent uptake in mid-rectal lesion that was present on prior PET 6/2018, which she has refused to work-up further. She completed definitive chemo-RT to 60 Gy in 30 fx to recurrent disease on 12/31/19. She was diagnosed with a local recurrence in the Right lung in 1/2021. She has received immunotherapy with Dr. Dhaliwal complicated by severe colitis, so subsequently discontinued. She was off of therapy for some time this year to recover from colitis, but recent PET/CT 10/6/22 demonstrated progression of Right suprahilar lesion (along staple line) and persistent uptake in a more inferior paraesophageal lesion. She has resumed chemotherapy and is interested in whether she has any additional radiation options.     I reviewed her imaging in detail with her. She appears to have 2 areas of disease both in the Right lung, and both lesions were included in the 60 Gy region during her prior radiation course. Since it has been 3 years since her prior course of radiation, I do think it would be feasible to deliver additional treatment to these areas, likely between 45-52.5 Gy in 15 fractions based on my review of imaging. I expressed that my primary concern with re-irradiation is late toxicity to the esophagus  (potentially fistula or stricture) and possibly to the pulmonary vasculature. I would not be able to determine the extent of these risks without pursuing radiation treatment planning to assess the max dose/volumes of these structures involved.     She was also curious about whether proton therapy would offer an advantage in this setting. I advised that I do not have experience with proton therapy treatment planning, so I think it would be best that she get an opinion from a Radiation Oncologist at [facility], which she is pursuing. The more inferior lesion is directly abutting the esophagus, so I am not sure that proton therapy will help reduce delivery of high dose to this region, but it may help with reducing the overall volume of OARs receiving higher dose.     We also discussed goals of care and what her options are going forward from most aggressive to focusing on comfort: 1) Consider local therapy with re-irradiation, 2) continue with chemotherapy, 3) discontinue chemotherapy and establish care with Palliative Medicine. Her current disease volume is fairly limited, so I do not believe it is a threat to her life within the next 6 months, precluding hospice.          Plan   1) At the end of our discussion, she indicated she will set up consultation with [facility] regarding proton therapy and will notify me of how that discussion goes. In the meantime she will continue systemic therapy as tolerated with Dr. Dhaliwal.        Face to Face time with patient: 30 minutes  45 minutes of total time spent on the encounter, which includes face to face time and non-face to face time preparing to see the patient (eg, review of tests), Obtaining and/or reviewing separately obtained history, Documenting clinical information in the electronic or other health record, Independently interpreting results (not separately reported) and communicating results to the patient/family/caregiver, or Care coordination (not separately reported).          Chief Complaint   Patient presents with    Follow-up       HPI: Since her last visit with me over 2 years ago, she was diagnosed with a local recurrence in the Right lung in 1/2021. She has received immunotherapy with Dr. Dhaliwal complicated by severe colitis, so subsequently discontinued. She was off of therapy for some time this year to recover from colitis, but recent PET/CT 10/6/22 demonstrated progression of Right suprahilar lesion (along staple line) and persistent uptake in a more inferior paraesophageal lesion. She has resumed chemotherapy and is interested in whether she has any additional radiation options.     In clinic today, she is accompanied by her wife. She reports that chemotherapy is taking a lot out of her, making her feel more drained. +Cough and dyspnea on exertion. Denies fevers or chest pain.       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     Squamous cell carcinoma of skin 10/05/2020    left medial thigh    Therapy     Thyroid nodule     Ulcerative colitis with complication        Past Surgical History:   Procedure Laterality Date    ADENOIDECTOMY  20yo    ANTERIOR CERVICAL DISCECTOMY W/ FUSION N/A 10/15/2018    Procedure: DISCECTOMY, SPINE, CERVICAL, ANTERIOR APPROACH, WITH FUSION C6/7  Depuy SNS: Motors/SSEP/Vocal Cord Regular OR table;  Surgeon: Greyson Bansal MD;  Location: [facility] OR [address] [place] [address];  Service: Neurosurgery;  Laterality: N/A;    APPENDECTOMY      BONE RESECTION, RIB  1980    BREAST BIOPSY Left     at least 40yrs ago in her 20's    BREAST CYST ASPIRATION      BREAST CYST EXCISION      COLONOSCOPY      ENDOBRONCHIAL ULTRASOUND N/A 7/10/2018    Procedure: ULTRASOUND,  ENDOBRONCHIAL;  Surgeon: Sri Hearn MD;  Location: [facility] OR 2ND FLR;  Service: Pulmonary;  Laterality: N/A;    ENDOBRONCHIAL ULTRASOUND N/A 9/24/2019    Procedure: ENDOBRONCHIAL ULTRASOUND (EBUS);  Surgeon: Sri Hearn MD;  Location: [facility] OR 2ND FLR;  Service: Pulmonary;  Laterality: N/A;    ENDOSCOPIC MUCOSAL RESECTION OF COLON N/A 9/11/2020    Procedure: RESECTION, MUCOSA, COLON, ENDOSCOPIC;  Surgeon: Lilibeth Carbajal MD;  Location: [facility] (2ND FLR);  Service: Endoscopy;  Laterality: N/A;    ENDOSCOPIC ULTRASOUND OF LOWER GASTROINTESTINAL TRACT N/A 4/19/2021    Procedure: ULTRASOUND, LOWER GI TRACT, ENDOSCOPIC;  Surgeon: Lilibeth Carbajal MD;  Location: [facility];  Service: Endoscopy;  Laterality: N/A;    ENDOSCOPIC ULTRASOUND OF LOWER GASTROINTESTINAL TRACT N/A 6/25/2021    Procedure: ULTRASOUND, LOWER GI TRACT, ENDOSCOPIC;  Surgeon: Silvino Christopher MD;  Location: [facility];  Service: Endoscopy;  Laterality: N/A;  Needs Rapid MP    FLEXIBLE SIGMOIDOSCOPY  06/11/2020    with biopsies    FLEXIBLE SIGMOIDOSCOPY N/A 6/11/2020    Procedure: SIGMOIDOSCOPY, FLEXIBLE;  Surgeon: Gely Yeh MD;  Location: [facility];  Service: Endoscopy;  Laterality: N/A;    FLEXIBLE SIGMOIDOSCOPY N/A 4/19/2021    Procedure: SIGMOIDOSCOPY-FLEXIBLE;  Surgeon: Lilibeth Carbajal MD;  Location: [facility];  Service: Endoscopy;  Laterality: N/A;  Covid 4/16 Brian MP    FLEXIBLE SIGMOIDOSCOPY N/A 6/3/2022    Procedure: SIGMOIDOSCOPY-FLEXIBLE;  Surgeon: Francesco Clark MD;  Location: [facility] (4TH FLR);  Service: Endoscopy;  Laterality: N/A;  vacc-inst portal-tb    HYSTERECTOMY      @48yrs of age    INSERTION OF TUNNELED CENTRAL VENOUS CATHETER (CVC) WITH SUBCUTANEOUS PORT N/A 9/25/2018    Procedure: [id number];  Surgeon: [facility] Diagnostic Provider;  Location: [facility] OR 2ND FLR;  Service: Radiology;  Laterality: N/A;    INSERTION OF VENOUS ACCESS PORT N/A 3/15/2021    Procedure: INSERTION, VENOUS ACCESS PORT;   Surgeon: Chang Mathews MD;  [facility];  Service: General;  Laterality: N/A;  NEEDS CONSENT.    KIDNEY SURGERY      18yo    MEDIPORT REMOVAL N/A 3/15/2021    Procedure: REMOVAL, CATHETER, CENTRAL VENOUS, TUNNELED, WITH PORT;  Surgeon: Chang Mathews MD;  [facility];  Service: General;  Laterality: N/A;    OOPHORECTOMY      @48yrs of age    SKIN BIOPSY      TONSILLECTOMY      UPPER GASTROINTESTINAL ENDOSCOPY      VIDEO-ASSISTED THORACOSCOPIC LOBECTOMY Right 2018    Procedure: VATS, WITH LOBECTOMY Possible chest wall resection;  Surgeon: Philip Messina MD;  [facility];  Service: Thoracic;  Laterality: Right;  Have open pan available.       Social History     Tobacco Use    Smoking status: Former     Packs/day: 1.00     Years: 30.00     Pack years: 30.00     Types: Cigarettes     Quit date: 2015     Years since quittin.4    Smokeless tobacco: Never   Substance Use Topics    Alcohol use: Not Currently     Alcohol/week: 0.0 standard drinks     Comment: socially     Drug use: No       Cancer-related family history includes Cancer in her father. There is no history of Melanoma.    Current Outpatient Medications on File Prior to Visit   Medication Sig Dispense Refill    ascorbic acid, vitamin C, (VITAMIN C) 500 MG tablet Take 500 mg by mouth once daily.      atenoloL (TENORMIN) 25 MG tablet Take 1 tablet (25 mg total) by mouth 3 (three) times daily. 270 tablet 3    calcium carbonate (TUMS) 300 mg (750 mg) Chew Take by mouth as needed.       dexAMETHasone (DECADRON) 6 MG tablet Take 2 tablets for 2 days after chemo with breakfast 20 tablet 3    famotidine (PEPCID) 10 MG tablet Take 10 mg by mouth once daily.      folic acid (FOLVITE) 1 MG tablet Take 1 tablet (1 mg total) by mouth once daily. Start today 90 tablet 3    HYDROcodone-acetaminophen (NORCO) 7.5-325 mg per tablet Take 1 tablet by mouth every 8 (eight) hours as needed for Pain. 90 tablet  "0    LORazepam (ATIVAN) 0.5 MG tablet TAKE 1 TABLET EVERY 12 HOURS AS NEEDED FOR ANXIETY 60 tablet 2    losartan (COZAAR) 50 MG tablet 1 tab po bid 180 tablet 3     No current facility-administered medications on file prior to visit.       Review of patient's allergies indicates:   Allergen Reactions    Adhesive Itching, Rash and Blisters     INCLUDES EKG PADS    Ciprofloxacin Hives     Hives    Iodinated contrast media     Pcn [penicillins]      rash    Phenergan plain Other (See Comments)     "crazy behavior"    Phenergan [promethazine]     Tramadol     Vancomycin analogues      Red man syndrome       Review of Systems   Constitutional:  Positive for malaise/fatigue. Negative for fever and weight loss.   HENT:  Negative for ear pain and sore throat.    Eyes:  Negative for blurred vision and double vision.   Respiratory:  Positive for cough and shortness of breath. Negative for hemoptysis.    Cardiovascular:  Negative for chest pain and leg swelling.   Gastrointestinal:  Positive for constipation and nausea. Negative for abdominal pain, diarrhea and heartburn.   Genitourinary:  Negative for dysuria and hematuria.   Musculoskeletal:  Positive for back pain and joint pain. Negative for falls.   Neurological:  Positive for tingling. Negative for speech change, focal weakness, seizures and headaches.   Psychiatric/Behavioral:  Negative for depression. The patient is nervous/anxious.       Vital Signs: BP (!) 146/60 (BP Location: Left arm, Patient Position: Sitting)   Pulse 66   Temp 97.3 °F (36.3 °C)   Resp 17   Ht 5' 6" (1.676 m)   Wt 69.7 kg (153 lb 10.6 oz)   LMP  (LMP Unknown)   SpO2 99%   BMI 24.80 kg/m²     ECOG Performance Status: 0    Physical Exam  Vitals reviewed.   Constitutional:       Appearance: Normal appearance.   HENT:      Head: Normocephalic and atraumatic.   Eyes:      General: No scleral icterus.     Extraocular Movements: Extraocular movements intact.   Pulmonary:      Effort: No accessory " muscle usage or respiratory distress.   Abdominal:      General: There is no distension.   Musculoskeletal:         General: Normal range of motion.      Cervical back: Normal range of motion and neck supple.   Lymphadenopathy:      Cervical: No cervical adenopathy.   Skin:     General: Skin is dry.      Coloration: Skin is not jaundiced.   Neurological:      Mental Status: She is alert and oriented to person, place, and time.      Cranial Nerves: No cranial nerve deficit.   Psychiatric:         Mood and Affect: Mood and affect normal.         Judgment: Judgment normal.        Labs:    Imaging: I have personally reviewed the patient's available images and reports and summarized pertinent findings above in HPI.     Pathology: No new path.        Other

## 2024-07-27 NOTE — ED ADULT NURSE REASSESSMENT NOTE - PATIENT ON (OXYGEN DELIVERY METHOD)
Spoke to patient in regards to MRI . Patient verbalized understanding. Patient will set a F/U appointment. room air

## 2024-07-27 NOTE — ED CDU PROVIDER INITIAL DAY NOTE - WR ORDER NAME 1
Xray Chest 1 View- PORTABLE-Urgent Initiate Treatment: Mupirocin 2% Ointment bid Detail Level: Zone Render In Strict Bullet Format?: No

## 2024-07-27 NOTE — ED ADULT NURSE NOTE - NSFALLRISKINTERV_ED_ALL_ED

## 2024-07-27 NOTE — ED PROVIDER NOTE - OBJECTIVE STATEMENT
Patient is a 88 y female pmhx htn hypothyroidism afib on eliquis ppm in place pw/ multiple episodes of near syncope, pt has been having episodes where she is feels a warm sensation throughout her body and feels faint but does not pass out. Otherwise denies any fever, chills, headache, changes in vision, cough, congestion, cp, palpitations, sob, n/v/d, abd pain, constipation, urinary complaints, lower extremity pain/swelling.

## 2024-07-27 NOTE — ED CDU PROVIDER INITIAL DAY NOTE - OBJECTIVE STATEMENT
Patient is a 88 y female pmhx htn hypothyroidism afib on eliquis ppm in place pw/ multiple episodes of near syncope, pt has been having episodes where she is feels a warm sensation throughout her body and feels faint but does not pass out. Otherwise denies any fever, chills, headache, changes in vision, cough, congestion, cp, palpitations, sob, n/v/d, abd pain, constipation, urinary complaints, lower extremity pain/swelling. Placed in OBS for near syncope and TTE to be done. Pacemaker was interrogated while in ED and no events recorded

## 2024-07-28 DIAGNOSIS — R55 SYNCOPE AND COLLAPSE: ICD-10-CM

## 2024-07-28 PROCEDURE — 80061 LIPID PANEL: CPT

## 2024-07-28 PROCEDURE — 70496 CT ANGIOGRAPHY HEAD: CPT | Mod: MC

## 2024-07-28 PROCEDURE — 70498 CT ANGIOGRAPHY NECK: CPT | Mod: MC

## 2024-07-28 PROCEDURE — 87040 BLOOD CULTURE FOR BACTERIA: CPT

## 2024-07-28 PROCEDURE — 85730 THROMBOPLASTIN TIME PARTIAL: CPT

## 2024-07-28 PROCEDURE — 36415 COLL VENOUS BLD VENIPUNCTURE: CPT

## 2024-07-28 PROCEDURE — 86140 C-REACTIVE PROTEIN: CPT

## 2024-07-28 PROCEDURE — 93306 TTE W/DOPPLER COMPLETE: CPT | Mod: 26

## 2024-07-28 PROCEDURE — 85652 RBC SED RATE AUTOMATED: CPT

## 2024-07-28 PROCEDURE — 82607 VITAMIN B-12: CPT

## 2024-07-28 PROCEDURE — 99233 SBSQ HOSP IP/OBS HIGH 50: CPT | Mod: 24

## 2024-07-28 PROCEDURE — 83036 HEMOGLOBIN GLYCOSYLATED A1C: CPT

## 2024-07-28 PROCEDURE — 93005 ELECTROCARDIOGRAM TRACING: CPT

## 2024-07-28 PROCEDURE — 93010 ELECTROCARDIOGRAM REPORT: CPT

## 2024-07-28 PROCEDURE — 93280 PM DEVICE PROGR EVAL DUAL: CPT

## 2024-07-28 PROCEDURE — 84145 PROCALCITONIN (PCT): CPT

## 2024-07-28 PROCEDURE — 82746 ASSAY OF FOLIC ACID SERUM: CPT

## 2024-07-28 PROCEDURE — 70450 CT HEAD/BRAIN W/O DYE: CPT | Mod: 26

## 2024-07-28 PROCEDURE — 85610 PROTHROMBIN TIME: CPT

## 2024-07-28 PROCEDURE — 0241U: CPT

## 2024-07-28 PROCEDURE — 80053 COMPREHEN METABOLIC PANEL: CPT

## 2024-07-28 PROCEDURE — 70450 CT HEAD/BRAIN W/O DYE: CPT | Mod: MC

## 2024-07-28 PROCEDURE — 95819 EEG AWAKE AND ASLEEP: CPT

## 2024-07-28 PROCEDURE — 99222 1ST HOSP IP/OBS MODERATE 55: CPT

## 2024-07-28 PROCEDURE — 85025 COMPLETE CBC W/AUTO DIFF WBC: CPT

## 2024-07-28 PROCEDURE — 84443 ASSAY THYROID STIM HORMONE: CPT

## 2024-07-28 PROCEDURE — 97161 PT EVAL LOW COMPLEX 20 MIN: CPT | Mod: GP

## 2024-07-28 RX ORDER — NITROGLYCERIN 400 UG/1
1 AEROSOL, METERED SUBLINGUAL DAILY
Refills: 0 | Status: DISCONTINUED | OUTPATIENT
Start: 2024-07-28 | End: 2024-07-30

## 2024-07-28 RX ORDER — MAGNESIUM, ALUMINUM HYDROXIDE 200-225/5
30 SUSPENSION, ORAL (FINAL DOSE FORM) ORAL EVERY 4 HOURS
Refills: 0 | Status: DISCONTINUED | OUTPATIENT
Start: 2024-07-28 | End: 2024-07-30

## 2024-07-28 RX ORDER — MELATONIN 3 MG
3 TABLET ORAL AT BEDTIME
Refills: 0 | Status: DISCONTINUED | OUTPATIENT
Start: 2024-07-28 | End: 2024-07-30

## 2024-07-28 RX ORDER — ACETAMINOPHEN 500 MG
650 TABLET ORAL EVERY 6 HOURS
Refills: 0 | Status: DISCONTINUED | OUTPATIENT
Start: 2024-07-28 | End: 2024-07-30

## 2024-07-28 RX ORDER — ONDANSETRON HCL/PF 4 MG/2 ML
4 VIAL (ML) INJECTION EVERY 8 HOURS
Refills: 0 | Status: DISCONTINUED | OUTPATIENT
Start: 2024-07-28 | End: 2024-07-30

## 2024-07-28 RX ADMIN — PANTOPRAZOLE SODIUM 40 MILLIGRAM(S): 20 TABLET, DELAYED RELEASE ORAL at 11:57

## 2024-07-28 RX ADMIN — RIVAROXABAN 20 MILLIGRAM(S): KIT at 17:46

## 2024-07-28 RX ADMIN — Medication 325 MILLIGRAM(S): at 15:02

## 2024-07-28 RX ADMIN — Medication 25 MICROGRAM(S): at 05:08

## 2024-07-28 RX ADMIN — FAMOTIDINE 20 MILLIGRAM(S): 40 TABLET, FILM COATED ORAL at 11:57

## 2024-07-28 RX ADMIN — Medication 25 MILLIGRAM(S): at 05:07

## 2024-07-28 NOTE — CONSULT NOTE ADULT - SUBJECTIVE AND OBJECTIVE BOX
Neurology Consult      HPI:   Patient is a 88 y female pmhx htn hypothyroidism afib on xarelto ppm in place pw/ multiple episodes of near syncope. Pt states episodes initially began 2 years ago, but have been getting worse recently. Endorses episodes consist of dizziness which she describes as lightheadedness/fogginess, and feeling of warmth. Pt denies being off balance or feeling like the room is spinning, denies any numbness or vision changes during these episodes. Pt states last episode was this morning, states standing or sitting does not make a difference. Pt denies focal weakness or numbness. Pt also denies autonomic symptoms such as dry eyes, increased tearing, increased salivation, chest pain, bowel or urinary urgency, or sweating during these episodes. Pt states she did fall a few weeks ago when she bent down to tie her shoe, states she falls sometimes when she bends down.     VS all wnl  Labs UA positive, otherwise wnl  CXR negative, x-ray wrist with avulsion fracture, TTE wnl  while in ed patient had pre-syncope three times  received LR bolus and admitted (28 Jul 2024 15:01)      PAST MEDICAL & SURGICAL HISTORY:  Atrial fibrillation, unspecified type      Hypothyroidism, unspecified type      Hyperlipidemia, unspecified hyperlipidemia type      Conduction disorder of the heart      H/O cardiac pacemaker          FAMILY HISTORY:  Family history of breast cancer (Sibling)    Family history of early CAD (Mother)        Social History: (-) x 3    Allergies    No Known Allergies    Intolerances        MEDICATIONS  (STANDING):  famotidine    Tablet 20 milliGRAM(s) Oral daily  levothyroxine 25 MICROGram(s) Oral daily  nitroglycerin    Patch 0.4 mG/Hr(s) 1 patch Transdermal daily  pantoprazole   Suspension 40 milliGRAM(s) Oral daily  rivaroxaban 20 milliGRAM(s) Oral with dinner    MEDICATIONS  (PRN):  acetaminophen     Tablet .. 650 milliGRAM(s) Oral every 6 hours PRN Temp greater or equal to 38C (100.4F), Mild Pain (1 - 3)  aluminum hydroxide/magnesium hydroxide/simethicone Suspension 30 milliLiter(s) Oral every 4 hours PRN Dyspepsia  melatonin 3 milliGRAM(s) Oral at bedtime PRN Insomnia  ondansetron Injectable 4 milliGRAM(s) IV Push every 8 hours PRN Nausea and/or Vomiting      Review of systems:    as per hpi    Vital Signs Last 24 Hrs  T(C): 37 (28 Jul 2024 14:49), Max: 37 (28 Jul 2024 14:49)  T(F): 98.6 (28 Jul 2024 14:49), Max: 98.6 (28 Jul 2024 14:49)  HR: 63 (28 Jul 2024 14:49) (60 - 63)  BP: 105/57 (28 Jul 2024 14:49) (105/57 - 144/85)  BP(mean): 77 (28 Jul 2024 14:49) (77 - 105)  RR: 18 (28 Jul 2024 14:49) (18 - 18)  SpO2: 99% (28 Jul 2024 14:49) (97% - 99%)    Parameters below as of 28 Jul 2024 14:49  Patient On (Oxygen Delivery Method): room air        Examination:  General:  Appearance is consistent with chronologic age.  No abnormal facies.  Gross skin survey within normal limits.    Cognitive/Language:  The patient is oriented to person, place, time and date.  Recent and remote memory intact.  Fund of knowledge is intact and normal.  Language with normal repetition, comprehension and naming.  Nondysarthric.    Eyes: intact VA, VFF.  EOMI w/o nystagmus, skew or reported double vision.  PERRL.  No ptosis/weakness of eyelid closure.    Face:  Facial sensation normal V1 - 3, no facial asymmetry.    Ears/Nose/Throat:  Hearing grossly intact b/l.  Palate elevates midline.  Tongue and uvula midline.   Motor examination:   Normal tone, bulk and range of motion.  No tenderness, twitching, tremors or involuntary movements.  Formal Muscle Strength Testing: (MRC grade R/L) 5/5 UE; 5/5 LE.  No observable drift.  Sensory examination:   Intact to light touch  in all extremities.  Cerebellum:   FTN/HKS intact with normal [facility] in all limbs.  No dysmetria or dysdiadokinesia.  Gait narrow based and normal.    NIH- 0     Labs:   CBC Full  -  ( 27 Jul 2024 13:09 )  WBC Count : 5.22 K/uL  RBC Count : 4.15 M/uL  Hemoglobin : 12.0 g/dL  Hematocrit : 36.8 %  Platelet Count - Automated : 184 K/uL  Mean Cell Volume : 88.7 fL  Mean Cell Hemoglobin : 28.9 pg  Mean Cell Hemoglobin Concentration : 32.6 g/dL  Auto Neutrophil # : 3.48 K/uL  Auto Lymphocyte # : 1.18 K/uL  Auto Monocyte # : 0.45 K/uL  Auto Eosinophil # : 0.07 K/uL  Auto Basophil # : 0.02 K/uL  Auto Neutrophil % : 66.7 %  Auto Lymphocyte % : 22.6 %  Auto Monocyte % : 8.6 %  Auto Eosinophil % : 1.3 %  Auto Basophil % : 0.4 %    07-27    136  |  101  |  15  ----------------------------<  94  4.6   |  26  |  0.8    Ca    9.1      27 Jul 2024 13:09    TPro  6.3  /  Alb  4.1  /  TBili  0.4  /  DBili  x   /  AST  18  /  ALT  7   /  AlkPhos  51  07-27    LIVER FUNCTIONS - ( 27 Jul 2024 13:09 )  Alb: 4.1 g/dL / Pro: 6.3 g/dL / ALK PHOS: 51 U/L / ALT: 7 U/L / AST: 18 U/L / GGT: x             Urinalysis Basic - ( 27 Jul 2024 13:09 )    Color: x / Appearance: x / SG: x / pH: x  Gluc: 94 mg/dL / Ketone: x  / Bili: x / Urobili: x   Blood: x / Protein: x / Nitrite: x   Leuk Esterase: x / RBC: x / WBC x   Sq Epi: x / Non Sq Epi: x / Bacteria: x          Neuroimaging:  [facility]:     07-28-24 @ 17:08

## 2024-07-28 NOTE — H&P ADULT - NSHPLABSRESULTS_GEN_ALL_CORE
LABS:  cret                        12.0   5.22  )-----------( 184      ( 27 Jul 2024 13:09 )             36.8     07-27    136  |  101  |  15  ----------------------------<  94  4.6   |  26  |  0.8    Ca    9.1      27 Jul 2024 13:09    TPro  6.3  /  Alb  4.1  /  TBili  0.4  /  DBili  x   /  AST  18  /  ALT  7   /  AlkPhos  51  07-27

## 2024-07-28 NOTE — ED CDU PROVIDER DISPOSITION NOTE - CLINICAL COURSE
Pt presents with near syncope. PPM check ok. labs ok. Echo no significant findings. In the past pt was no Midodrine. Neuro intact. Pt had 3 episode of near syncope while in observation. One episode I was present and blood pressure 107/55 HR 59. This resolved. Pt has a history of fall and three weeks ago fractured the right wrist. Admitted to correct blood pressure issue.

## 2024-07-28 NOTE — ED CDU PROVIDER SUBSEQUENT DAY NOTE - ATTENDING APP SHARED VISIT CONTRIBUTION OF CARE
Pt presents after several episodes of feeling lightheadedness and near syncope. Pt denies chest pain. hx of syncope in the past. has a PPM. Pt had been on Midodrine in the past but not currently. Pt was under the care of Dr. Dugan. Pacemaker was interrogated in the ER and no events found. On exam Awake, alert oriented. Son with pt and  phone used. On exam S1S2 rrr, no murmur, lungs clear, abdomen is soft nontender, neuro intact. NO swelling of the lower ext.

## 2024-07-28 NOTE — ED CDU PROVIDER SUBSEQUENT DAY NOTE - CLINICAL SUMMARY MEDICAL DECISION MAKING FREE TEXT BOX
Pt presents with near syncope. While in the ED pt felt lightheaded a few times. Blood pressure checked and 107/55.  1. Normal global left ventricular systolic function.   2. LV Ejection Fraction by Ames's Method with a biplane EF of 67 %.   3. Normal left atrial size.   4. Normal right atrial size.   5. Mild mitral valve regurgitation.   6. Mild tricuspid regurgitation.   7. Sclerotic aortic valve with normal opening.    Pt with near normal Echo. Likely autonomic dysfunction. Pt Will need admission to start meds to maintain blood pressure.

## 2024-07-28 NOTE — ED ADULT NURSE REASSESSMENT NOTE - NS ED NURSE REASSESS COMMENT FT1
Patient educated on importance of being a fall risk. Fall risk precautions implemented. Patient refuses to stay in bed. Patient A&Ox4.
Pt reassessed A/O times 4 Vs stable report felling the same on going dizziness weakness 2DECHO order is done Vs retaken stable Ed attending NAD noted did eat 75% of breakfast and lunch family members at bed site on going nursing observation .
Patient received from previous RN. Patient remains on cardiac monitor. Patient vitals taken.

## 2024-07-28 NOTE — CONSULT NOTE ADULT - NS ATTEND AMEND GEN_ALL_CORE FT
Patient seen at bedside. All pertinent labs, medications, images, notes, vitals reviewed personally. I agree with the above assessment and plan, which I have reviewed and edited.    IN summary,     89 yo woman with afib on xarelto and sinus arrest in 2014 s/p ppm presenting with multiple episodes of near syncope ongoing for at least several months. Description of events unlikely to be neurological in origin, would rule out cardiac etiology. Patient denies any symptoms that would be c/w vertebrobasilar insufficiency during the lightheadedness episodes but will followup CTA head/neck. Rest of plan as above.

## 2024-07-28 NOTE — H&P ADULT - ASSESSMENT
Patient is a 88 y female pmhx htn hypothyroidism afib on xarelto ppm in place pw/ multiple episodes of near syncope since two days ago, pt has been having episodes where she is feels a warm sensation begin in her feet and climb up to her head. She feels faint, has chest tightness but does not pass out. She experiences this randomly while sitting or standing. She fractured her wrist three weeks ago from a fall but was due to imbalance when she was tying her shoes and did not feel similar to her pre-syncopal episodes. patient was on midodrine in the past. Otherwise denies any fever, chills, headache, changes in vision, cough, congestion, cp, palpitations, sob, n/v/d, abd pain, constipation, urinary complaints, lower extremity pain/swelling.    VS all wnl  Labs UA positive, otherwise wnl  CXR negative, x-ray wrist with avulsion fracture, TTE wnl  while in ed patient had pre-syncope three times  received LR bolus and admitted    #Recurrent pre-syncope  - unknown etiology so far  - PPM investigated with no events  - no events on tele  - obtain orthostats  - TTE normal  - hold metoprolol for now and see if further episodes    #Afib on AC  - cont AC  - hold metoprolol for now    DVT proph-   Diet-   Act order- PT  AM labs

## 2024-07-28 NOTE — H&P ADULT - NSHPPHYSICALEXAM_GEN_ALL_CORE
LOS:     VITALS:   T(C): 37 (07-28-24 @ 14:49), Max: 37 (07-28-24 @ 14:49)  HR: 63 (07-28-24 @ 14:49) (60 - 65)  BP: 105/57 (07-28-24 @ 14:49) (105/57 - 144/85)  RR: 18 (07-28-24 @ 14:49) (17 - 18)  SpO2: 99% (07-28-24 @ 14:49) (97% - 99%)    GENERAL: NAD, lying in bed comfortably  HEAD:  Atraumatic, Normocephalic  EYES: EOMI, PERRLA, conjunctiva and sclera clear  ENT: Moist mucous membranes  NECK: Supple, No JVD  CHEST/LUNG: Clear to auscultation bilaterally; No rales, rhonchi, wheezing, or rubs. Unlabored respirations  HEART: Regular rate and rhythm; No murmurs, rubs, or gallops  ABDOMEN: BSx4; Soft, nontender, nondistended  EXTREMITIES:  2+ Peripheral Pulses, brisk capillary refill. No clubbing, cyanosis, or edema  NERVOUS SYSTEM:  A&Ox3, no focal deficits   SKIN: No rashes or lesions

## 2024-07-28 NOTE — CONSULT NOTE ADULT - ASSESSMENT
ASSESSMENT/RECS    Patient is a 88 y female pmhx htn hypothyroidism afib on xarelto ppm in place pw/ multiple episodes of near syncope. Pt describes episodes occurring for 2 years, increasing in frequency lately. Endorses feeling of warmth over whole body and associated lightheadedness. Pt denies any focal neurologic symptoms accompanying episodes (denies vision changes, numbness, room spinning, gait instability). Pt also denies autonomic symptoms such as increased salivation, bowel/bladder urgency, dry/tearing eyes, N/V, sweating. On exam, no focal neurologic deficits and NIHSS of 0. Lower suspicion of neurologic cause of presyncopal events given no focal deficits on exam and no focal neurologic symptoms during episodes. Will obtain CTH and intracranial vessels imaging to evaluate for stenosis, however symptomology more likely cardiac related or orthostatic hypotension given history of being on midodrine in the past.     RECS  - CTH   - CTA head and neck  - cardiac workup  - Obtain orthostatics     Discussed w Dr. Malin

## 2024-07-28 NOTE — H&P ADULT - ATTENDING COMMENTS
My note supersedes all residents notes that I sign, My correction for their notes are in my notes   the patient son at bedside who preferred to interpret himself - Symptoms started on Friday as hot feeling that ascend to from the foot to the head then near fainting bit no LOC of falls   On exam  General: awake, alert, NAD, chronic ill appearance  Lungs:  clear to ausculations b/l, normal resp effort  Heart: regular ryhthm   Abdomen: soft, non tender non distended  Ext: no edema, can move all  his extremities   Neuro: grossly non focal ? Resting tremors hands ( chronic as per the patient son)         Patient is a 88 y female pmhx htn hypothyroidism afib on xarelto ppm in place pw/ multiple episodes of near syncope since two days ago,     [] Recurrent pre-syncope episodes   [] hx of Afib on xaretlo   []  h/o sinus arrest 2014, s/p  DC PPM ([employer])   Pt initially in ED observation but she developed these episodes as well in the ED   - unknown etiology so far  -As per ED  PPM investigated  with no events  - no events on tele  - check orthostatic   - TTE normal  EF of 67 %., no significant structural pathology   - hold metoprolol for today given soft blood pressure   consult EP   c/w Tele   no sepsis RAJ , r/o infection , send blood cultures and check Procal and RVP   UA positive but asymptomatic , f/u cultures   monitor off antibiotics for now   Get CT head/ EEG   cw synthroid , Chek TSH and check  B12   Neurology consult   Neurochecks   low suspect of PE as she is on Xarelto , no chest pain or sob , saturating well on room air , no tachycardia   PT/OT     Hx of GERD   c/w Home PPI     DVT proph- ON XARELTO My note supersedes all residents notes that I sign, My correction for their notes are in my notes   the patient son at bedside who preferred to interpret himself - Symptoms started on Friday as hot feeling that ascend to from the foot to the head then near fainting bit no LOC of falls   On exam  General: awake, alert, NAD, chronic ill appearance  Lungs:  clear to ausculations b/l, normal resp effort  Heart: regular ryhthm   Abdomen: soft, non tender non distended  Ext: no edema, can move all  his extremities   Neuro: grossly non focal ? Resting tremors hands ( chronic as per the patient son)         Patient is a 88 y female pmhx htn hypothyroidism afib on xarelto ppm in place pw/ multiple episodes of near syncope since two days ago,     [] Recurrent pre-syncope episodes   [] hx of Afib on xaretlo   []  h/o sinus arrest 2014, s/p  DC PPM ([employer])     Pt has prior fall in May with fracture wrist     Pt initially in ED observation but she developed these episodes as well in the ED   - unknown etiology so far  trop negative x2, EKG atrial paced   -As per ED  PPM investigated  with no events  - no events on tele  - check orthostatic   - TTE normal  EF of 67 %., no significant structural pathology   - hold metoprolol for today given soft blood pressure   consult EP   c/w Tele   no sepsis RAJ , r/o infection , send blood cultures and check Procal and RVP   UA positive but asymptomatic , f/u cultures   monitor off antibiotics for now   Get CT head/ EEG   cw synthroid , Chek TSH and check  B12   Neurology consult   Neurochecks   fall / seizure precautions   low suspect of PE as she is on Xarelto , no chest pain or sob , saturating well on room air , no tachycardia   PT/OT     Hx of GERD   c/w Home PPI     DVT proph- ON XARELTO My note supersedes all residents notes that I sign, My correction for their notes are in my notes   the patient son at bedside who preferred to interpret himself - Symptoms started on Friday as hot feeling that ascend to from the foot to the head then near fainting bit no LOC of falls , not related to position or head movement , intermittent  , no precipitating or alleviating factors   On exam  General: awake, alert, NAD, chronic ill appearance  Lungs:  clear to ausculation b/l, normal resp effort  Heart: regular rhythm   Abdomen: soft, non tender non distended  Ext: no edema, can move all  his extremities   Neuro: grossly non focal ? Resting tremors hands ( chronic as per the patient son)         Patient is a 88 y female pmhx htn hypothyroidism afib on xarelto ppm in place pw/ multiple episodes of near syncope since two days ago,     [] Recurrent pre-syncope episodes   [] hx of Afib on xaretlo   []  h/o sinus arrest 2014, s/p  DC PPM ([employer])     Pt has prior fall in May with fracture wrist     Pt initially in ED observation but she developed these episodes as well in the ED   - unknown etiology so far  trop negative x2, EKG atrial paced   -As per ED  PPM investigated  with no events  - no events on tele  - check orthostatic   - TTE normal  EF of 67 %., no significant structural pathology   - hold metoprolol for today given soft blood pressure   consult EP   c/w Tele   no sepsis RAJ , r/o infection , send blood cultures and check Procal and RVP   UA positive but asymptomatic , f/u cultures   monitor off antibiotics for now   Get CT head/ EEG   cw synthroid , Chek TSH and check  B12   Neurology consult   Neurochecks   fall / seizure precautions   low suspect of PE as she is on Xarelto , no chest pain or sob , saturating well on room air , no tachycardia   PT/OT     Hx of GERD   c/w Home PPI     DVT proph- ON XARELTO

## 2024-07-28 NOTE — H&P ADULT - HISTORY OF PRESENT ILLNESS
Patient is a 88 y female pmhx htn hypothyroidism afib on eliquis ppm in place pw/ multiple episodes of near syncope, pt has been having episodes where she is feels a warm sensation throughout her body and feels faint but does not pass out. She has recurrent falls as well and fractured her wrist three weeks ago from a fall. patient was on midodrine in the past. Otherwise denies any fever, chills, headache, changes in vision, cough, congestion, cp, palpitations, sob, n/v/d, abd pain, constipation, urinary complaints, lower extremity pain/swelling.    VS all wnl  Labs UA positive, otherwise wnl  CXR negative, x-ray wrist with avulsion fracture, TTE wnl  while in ed patient had pre-syncope three times  received LR bolus and admitted  Patient is a 88 y female pmhx htn hypothyroidism afib on xarelto ppm in place pw/ multiple episodes of near syncope, pt has been having episodes where she is feels a warm sensation begin in her feet and climb up to her head. She feels faint, has chest tightness but does not pass out. She fractured her wrist three weeks ago from a fall but was due to imbalance when she was tying her shoes and did not feel similar to her pre-syncopal episodes. patient was on midodrine in the past. Otherwise denies any fever, chills, headache, changes in vision, cough, congestion, cp, palpitations, sob, n/v/d, abd pain, constipation, urinary complaints, lower extremity pain/swelling.    VS all wnl  Labs UA positive, otherwise wnl  CXR negative, x-ray wrist with avulsion fracture, TTE wnl  while in ed patient had pre-syncope three times  received LR bolus and admitted

## 2024-07-29 ENCOUNTER — TRANSCRIPTION ENCOUNTER (OUTPATIENT)
Age: 89
End: 2024-07-29

## 2024-07-29 LAB
A1C WITH ESTIMATED AVERAGE GLUCOSE RESULT: 5.7 % — HIGH (ref 4–5.6)
ALBUMIN SERPL ELPH-MCNC: 4 G/DL — SIGNIFICANT CHANGE UP (ref 3.5–5.2)
ALP SERPL-CCNC: 50 U/L — SIGNIFICANT CHANGE UP (ref 30–115)
ALT FLD-CCNC: 7 U/L — SIGNIFICANT CHANGE UP (ref 0–41)
ANION GAP SERPL CALC-SCNC: 13 MMOL/L — SIGNIFICANT CHANGE UP (ref 7–14)
APTT BLD: 33.5 SEC — SIGNIFICANT CHANGE UP (ref 27–39.2)
AST SERPL-CCNC: 19 U/L — SIGNIFICANT CHANGE UP (ref 0–41)
BASOPHILS # BLD AUTO: 0.05 K/UL — SIGNIFICANT CHANGE UP (ref 0–0.2)
BASOPHILS NFR BLD AUTO: 0.8 % — SIGNIFICANT CHANGE UP (ref 0–1)
BILIRUB SERPL-MCNC: 0.4 MG/DL — SIGNIFICANT CHANGE UP (ref 0.2–1.2)
BUN SERPL-MCNC: 17 MG/DL — SIGNIFICANT CHANGE UP (ref 10–20)
CALCIUM SERPL-MCNC: 8.9 MG/DL — SIGNIFICANT CHANGE UP (ref 8.4–10.5)
CHLORIDE SERPL-SCNC: 106 MMOL/L — SIGNIFICANT CHANGE UP (ref 98–110)
CHOLEST SERPL-MCNC: 217 MG/DL — HIGH
CO2 SERPL-SCNC: 22 MMOL/L — SIGNIFICANT CHANGE UP (ref 17–32)
CREAT SERPL-MCNC: 0.9 MG/DL — SIGNIFICANT CHANGE UP (ref 0.7–1.5)
CRP SERPL-MCNC: <3 MG/L — SIGNIFICANT CHANGE UP
CULTURE RESULTS: SIGNIFICANT CHANGE UP
EGFR: 62 ML/MIN/1.73M2 — SIGNIFICANT CHANGE UP
EOSINOPHIL # BLD AUTO: 0.2 K/UL — SIGNIFICANT CHANGE UP (ref 0–0.7)
EOSINOPHIL NFR BLD AUTO: 3.3 % — SIGNIFICANT CHANGE UP (ref 0–8)
ERYTHROCYTE [SEDIMENTATION RATE] IN BLOOD: 17 MM/HR — SIGNIFICANT CHANGE UP (ref 0–20)
ESTIMATED AVERAGE GLUCOSE: 117 MG/DL — HIGH (ref 68–114)
FLUAV AG NPH QL: SIGNIFICANT CHANGE UP
FLUBV AG NPH QL: SIGNIFICANT CHANGE UP
FOLATE SERPL-MCNC: 7.7 NG/ML — SIGNIFICANT CHANGE UP
GLUCOSE SERPL-MCNC: 87 MG/DL — SIGNIFICANT CHANGE UP (ref 70–99)
HCT VFR BLD CALC: 38.2 % — SIGNIFICANT CHANGE UP (ref 37–47)
HDLC SERPL-MCNC: 57 MG/DL — SIGNIFICANT CHANGE UP
HGB BLD-MCNC: 12.7 G/DL — SIGNIFICANT CHANGE UP (ref 12–16)
IMM GRANULOCYTES NFR BLD AUTO: 0.2 % — SIGNIFICANT CHANGE UP (ref 0.1–0.3)
INR BLD: 1.34 RATIO — HIGH (ref 0.65–1.3)
LIPID PNL WITH DIRECT LDL SERPL: 147 MG/DL — HIGH
LYMPHOCYTES # BLD AUTO: 1.53 K/UL — SIGNIFICANT CHANGE UP (ref 1.2–3.4)
LYMPHOCYTES # BLD AUTO: 25.1 % — SIGNIFICANT CHANGE UP (ref 20.5–51.1)
MCHC RBC-ENTMCNC: 29.1 PG — SIGNIFICANT CHANGE UP (ref 27–31)
MCHC RBC-ENTMCNC: 33.2 G/DL — SIGNIFICANT CHANGE UP (ref 32–37)
MCV RBC AUTO: 87.4 FL — SIGNIFICANT CHANGE UP (ref 81–99)
MONOCYTES # BLD AUTO: 0.79 K/UL — HIGH (ref 0.1–0.6)
MONOCYTES NFR BLD AUTO: 13 % — HIGH (ref 1.7–9.3)
NEUTROPHILS # BLD AUTO: 3.51 K/UL — SIGNIFICANT CHANGE UP (ref 1.4–6.5)
NEUTROPHILS NFR BLD AUTO: 57.6 % — SIGNIFICANT CHANGE UP (ref 42.2–75.2)
NON HDL CHOLESTEROL: 160 MG/DL — HIGH
NRBC # BLD: 0 /100 WBCS — SIGNIFICANT CHANGE UP (ref 0–0)
PLATELET # BLD AUTO: 174 K/UL — SIGNIFICANT CHANGE UP (ref 130–400)
PMV BLD: 12 FL — HIGH (ref 7.4–10.4)
POTASSIUM SERPL-MCNC: 4.3 MMOL/L — SIGNIFICANT CHANGE UP (ref 3.5–5)
POTASSIUM SERPL-SCNC: 4.3 MMOL/L — SIGNIFICANT CHANGE UP (ref 3.5–5)
PROCALCITONIN SERPL-MCNC: 0.04 NG/ML — SIGNIFICANT CHANGE UP (ref 0.02–0.1)
PROT SERPL-MCNC: 6.4 G/DL — SIGNIFICANT CHANGE UP (ref 6–8)
PROTHROM AB SERPL-ACNC: 15.3 SEC — HIGH (ref 9.95–12.87)
RBC # BLD: 4.37 M/UL — SIGNIFICANT CHANGE UP (ref 4.2–5.4)
RBC # FLD: 14.1 % — SIGNIFICANT CHANGE UP (ref 11.5–14.5)
RSV RNA NPH QL NAA+NON-PROBE: SIGNIFICANT CHANGE UP
SARS-COV-2 RNA SPEC QL NAA+PROBE: SIGNIFICANT CHANGE UP
SODIUM SERPL-SCNC: 141 MMOL/L — SIGNIFICANT CHANGE UP (ref 135–146)
SPECIMEN SOURCE: SIGNIFICANT CHANGE UP
TRIGL SERPL-MCNC: 70 MG/DL — SIGNIFICANT CHANGE UP
TSH SERPL-MCNC: 5.58 UIU/ML — HIGH (ref 0.27–4.2)
VIT B12 SERPL-MCNC: 578 PG/ML — SIGNIFICANT CHANGE UP (ref 232–1245)
WBC # BLD: 6.09 K/UL — SIGNIFICANT CHANGE UP (ref 4.8–10.8)
WBC # FLD AUTO: 6.09 K/UL — SIGNIFICANT CHANGE UP (ref 4.8–10.8)

## 2024-07-29 PROCEDURE — 70498 CT ANGIOGRAPHY NECK: CPT | Mod: 26

## 2024-07-29 PROCEDURE — 70496 CT ANGIOGRAPHY HEAD: CPT | Mod: 26

## 2024-07-29 PROCEDURE — 99232 SBSQ HOSP IP/OBS MODERATE 35: CPT

## 2024-07-29 PROCEDURE — 95816 EEG AWAKE AND DROWSY: CPT | Mod: 26

## 2024-07-29 PROCEDURE — 99222 1ST HOSP IP/OBS MODERATE 55: CPT

## 2024-07-29 PROCEDURE — 93280 PM DEVICE PROGR EVAL DUAL: CPT | Mod: 26

## 2024-07-29 RX ORDER — MECLIZINE 12.5 MG/1
25 TABLET ORAL
Refills: 0 | Status: DISCONTINUED | OUTPATIENT
Start: 2024-07-29 | End: 2024-07-30

## 2024-07-29 RX ORDER — LORATADINE 10 MG
17 TABLET,DISINTEGRATING ORAL
Refills: 0 | Status: DISCONTINUED | OUTPATIENT
Start: 2024-07-29 | End: 2024-07-30

## 2024-07-29 RX ORDER — MECLIZINE 12.5 MG/1
1 TABLET ORAL
Qty: 28 | Refills: 0
Start: 2024-07-29 | End: 2024-08-11

## 2024-07-29 RX ADMIN — Medication 650 MILLIGRAM(S): at 21:27

## 2024-07-29 RX ADMIN — Medication 650 MILLIGRAM(S): at 20:57

## 2024-07-29 RX ADMIN — MECLIZINE 25 MILLIGRAM(S): 12.5 TABLET ORAL at 10:06

## 2024-07-29 RX ADMIN — NITROGLYCERIN 1 PATCH: 400 AEROSOL, METERED SUBLINGUAL at 11:24

## 2024-07-29 RX ADMIN — MECLIZINE 25 MILLIGRAM(S): 12.5 TABLET ORAL at 17:51

## 2024-07-29 RX ADMIN — Medication 17 GRAM(S): at 17:52

## 2024-07-29 RX ADMIN — NITROGLYCERIN 1 PATCH: 400 AEROSOL, METERED SUBLINGUAL at 23:50

## 2024-07-29 RX ADMIN — Medication 17 GRAM(S): at 10:06

## 2024-07-29 RX ADMIN — RIVAROXABAN 20 MILLIGRAM(S): KIT at 17:50

## 2024-07-29 RX ADMIN — FAMOTIDINE 20 MILLIGRAM(S): 40 TABLET, FILM COATED ORAL at 11:24

## 2024-07-29 RX ADMIN — Medication 25 MICROGRAM(S): at 05:33

## 2024-07-29 RX ADMIN — NITROGLYCERIN 1 PATCH: 400 AEROSOL, METERED SUBLINGUAL at 19:16

## 2024-07-29 RX ADMIN — PANTOPRAZOLE SODIUM 40 MILLIGRAM(S): 20 TABLET, DELAYED RELEASE ORAL at 11:24

## 2024-07-29 NOTE — PATIENT PROFILE ADULT - FALL HARM RISK - HARM RISK INTERVENTIONS

## 2024-07-29 NOTE — DISCHARGE NOTE PROVIDER - ATTENDING DISCHARGE PHYSICAL EXAMINATION:
No Attending attestation  Attending DC note  Pt seen and examined at bedside. No cp or sob. Pt feeling better. STarted on meclizine, possible vertigo   vitals, labs, exam stable  Hospital course as above.  Plan dw pt and agreed to plan  Medically cleared for DC. Med recc completed.  TERRIE resident. Spent 32 mins on case

## 2024-07-29 NOTE — DISCHARGE NOTE PROVIDER - NSFOLLOWUPCLINICS_GEN_ALL_ED_FT
[facility]  Neurology  [address] [place] [address], [address]  [place] [address]  Phone: [phone]  Fax:

## 2024-07-29 NOTE — PATIENT PROFILE ADULT - NSPROPTRIGHTCAREGIVER_GEN_A_NUR
[Well Developed] : well developed [Well Nourished] : well nourished [No Acute Distress] : no acute distress [Normal Conjunctiva] : normal conjunctiva [Normal Venous Pressure] : normal venous pressure [No Carotid Bruit] : no carotid bruit [Normal S1, S2] : normal S1, S2 [No Murmur] : no murmur [No Rub] : no rub [No Gallop] : no gallop [Clear Lung Fields] : clear lung fields [Good Air Entry] : good air entry [No Respiratory Distress] : no respiratory distress  [Soft] : abdomen soft [Non Tender] : non-tender [No Masses/organomegaly] : no masses/organomegaly [Normal Bowel Sounds] : normal bowel sounds [Normal Gait] : normal gait [No Edema] : no edema [No Cyanosis] : no cyanosis [No Clubbing] : no clubbing [No Varicosities] : no varicosities [No Rash] : no rash [No Skin Lesions] : no skin lesions [Moves all extremities] : moves all extremities [No Focal Deficits] : no focal deficits [Normal Speech] : normal speech [Alert and Oriented] : alert and oriented [Normal memory] : normal memory [de-identified] : Chaperone: Marcia Ludwig RN no

## 2024-07-29 NOTE — PHYSICAL THERAPY INITIAL EVALUATION ADULT - GENERAL OBSERVATIONS, REHAB EVAL
8:30-8:50 Pt. encountered exiting restroom in NAD. Agreeable to PT. Endorses dizziness in standing. VSS. Danae PLASCENCIA aware.

## 2024-07-29 NOTE — PHYSICAL THERAPY INITIAL EVALUATION ADULT - NSPTDISCHREC_GEN_A_CORE
D/c PT at this time. Pt. requires supervision for functional mobility 2* to c/o dizziness. Recommending continued ambulation on unit with nursing supervision. Please re-consult if any change in fxnl status./No skilled PT needs

## 2024-07-29 NOTE — PROGRESS NOTE ADULT - ASSESSMENT
# Recurrent pre-syncope episodes jaspreet vertigo   # hx of Afib on xaretlo   # h/o sinus arrest 2014, s/p  DC PPM ([employer])   - Pt has prior fall in May with fracture wrist   - Pt initially in ED observation but she developed these episodes as well in the ED   - trop negative x2, EKG atrial paced   - As per ED  PPM investigated  with no events  - no events on tele  - othostatic negative  - TTE normal EF of 67 %., no significant structural pathology   - c/w Tele   - UA positive but asymptomatic   - monitor off antibiotics for now   - EEG negative  - Neurology signed off  - cw synthroid   - low suspect of PE as she is on Xarelto , no chest pain or sob , saturating well on room air , no tachycardia   - PT evaluated and signed off, can dc home  - EP ICD interrogation negative  - CT neck with angio pending     Hx of GERD   - c/w Home Proton Pump Inhibitor     #hypothyoridism- synthroid, tsh 5.5    #Progress Note Handoff  Pending (specify):  DC if cta h/n neg   Family discussion: house staff updated pt family  Disposition: home   Decision to admit the pt is based on acuity as above

## 2024-07-29 NOTE — DISCHARGE NOTE PROVIDER - HOSPITAL COURSE
Patient is a 88 y female pmhx htn hypothyroidism afib on xarelto ppm in place pw/ multiple episodes of near syncope, pt has been having episodes where she is feels a warm sensation begin in her feet and climb up to her head. She feels faint, has chest tightness but does not pass out. She fractured her wrist three weeks ago from a fall but was due to imbalance when she was tying her shoes and did not feel similar to her pre-syncopal episodes. patient was on midodrine in the past. Otherwise denies any fever, chills, headache, changes in vision, cough, congestion, cp, palpitations, sob, n/v/d, abd pain, constipation, urinary complaints, lower extremity pain/swelling.    VS all wnl  Labs UA positive, otherwise wnl  CXR negative, x-ray wrist with avulsion fracture, TTE wnl  while in ed patient had pre-syncope three times  received LR bolus and admitted    # Recurrent pre-syncope episodes   # hx of Afib on xaretlo   # h/o sinus arrest 2014, s/p  DC PPM ([facility])   - Pt has prior fall in May with fracture wrist   - Pt initially in ED observation but she developed these episodes as well in the ED   - trop negative x2, EKG atrial paced   - As per ED  PPM investigated  with no events  - no events on tele  - othostatic negative  - TTE normal EF of 67 %., no significant structural pathology   - c/w Tele   - UA positive but asymptomatic   - monitor off antibiotics for now   - EEG negative  - Neurology signed off  - cw synthroid   - low suspect of PE as she is on Xarelto , no chest pain or sob , saturating well on room air , no tachycardia   - PT evaluated and signed off, can dc home  - CT neck with angio     Hx of GERD   - c/w Home PPI     Discussion of discharge plan of care, including discharge diagnosis, medication reconciliation, and follow-ups, was conducted with Dr. Ashraf on 07/29/2024, and discharge was approved  Patient is a 88 y female pmhx htn hypothyroidism afib on xarelto ppm in place pw/ multiple episodes of near syncope, pt has been having episodes where she is feels a warm sensation begin in her feet and climb up to her head. She feels faint, has chest tightness but does not pass out. She fractured her wrist three weeks ago from a fall but was due to imbalance when she was tying her shoes and did not feel similar to her pre-syncopal episodes. patient was on midodrine in the past. Otherwise denies any fever, chills, headache, changes in vision, cough, congestion, cp, palpitations, sob, n/v/d, abd pain, constipation, urinary complaints, lower extremity pain/swelling.    VS all wnl  Labs UA positive, otherwise wnl  CXR negative, x-ray wrist with avulsion fracture, TTE wnl  while in ed patient had pre-syncope three times  received LR bolus and admitted    # Recurrent pre-syncope episodes   # hx of Afib on xaretlo   # h/o sinus arrest 2014, s/p  DC PPM ([employer])   - Pt has prior fall in May with fracture wrist   - Pt initially in ED observation but she developed these episodes as well in the ED   - trop negative x2, EKG atrial paced   - As per ED  PPM investigated  with no events  - no events on tele  - othostatic negative  - TTE normal EF of 67 %., no significant structural pathology   - c/w Tele   - UA positive but asymptomatic   - monitor off antibiotics for now   - EEG negative  - Neurology signed off  - cw synthroid   - low suspect of PE as she is on Xarelto , no chest pain or sob , saturating well on room air , no tachycardia   - PT evaluated and signed off, can dc home  - EP ICD interrogation negative  - CT neck with angio     Hx of GERD   - c/w Home PPI     Discussion of discharge plan of care, including discharge diagnosis, medication reconciliation, and follow-ups, was conducted with Dr. Ashraf on 07/29/2024, and discharge was approved  Patient is a 88 y female pmhx htn hypothyroidism afib on xarelto ppm in place pw/ multiple episodes of near syncope, pt has been having episodes where she is feels a warm sensation begin in her feet and climb up to her head. She feels faint, has chest tightness but does not pass out. She fractured her wrist three weeks ago from a fall but was due to imbalance when she was tying her shoes and did not feel similar to her pre-syncopal episodes. patient was on midodrine in the past. Otherwise denies any fever, chills, headache, changes in vision, cough, congestion, cp, palpitations, sob, n/v/d, abd pain, constipation, urinary complaints, lower extremity pain/swelling.    VS all wnl  Labs UA positive, otherwise wnl  CXR negative, x-ray wrist with avulsion fracture, TTE wnl  while in ed patient had pre-syncope three times  received LR bolus and admitted    Patient admitted to medicine for work up of recurrent presyncope episodes.   # Recurrent pre-syncope episodes   # hx of Afib on xaretlo   # h/o sinus arrest 2014, s/p  DC PPM ([employer])   - Pt has prior fall in May with fracture wrist   - Pt initially in ED observation but she developed these episodes as well in the ED   - trop negative x2, EKG atrial paced   - As per ED  PPM investigated  with no events  - no events on tele  - othostatic negative  - TTE normal EF of 67 %., no significant structural pathology   - c/w Tele   - UA positive but asymptomatic   - monitor off antibiotics for now   - EEG negative  - Neurology signed off  - cw synthroid   - low suspect of PE as she is on Xarelto , no chest pain or sob , saturating well on room air , no tachycardia   - PT evaluated and signed off, can dc home  - EP ICD interrogation negative  - CT neck with angio-  no notable stenosis in posterior circulation, 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 No further workup from neurology standpoint at this time. Please have patient followup with Neuro IR as outpatient for aneurysm.    Hx of GERD   - c/w Home PPI     Discussion of discharge plan of care, including discharge diagnosis, medication reconciliation, and follow-ups, and discharge was approved

## 2024-07-29 NOTE — DISCHARGE NOTE PROVIDER - NSDCMRMEDTOKEN_GEN_ALL_CORE_FT
levothyroxine 25 mcg (0.025 mg) oral tablet: 1 tab(s) orally once a day  meclizine 25 mg oral tablet: 1 tab(s) orally 2 times a day as needed for  dizziness  Metoprolol Succinate ER 25 mg oral tablet, extended release: 1 tab(s) orally once a day (at bedtime)  nitroglycerin 0.4 mg/hr transdermal film, extended release:   Xarelto 20 mg oral tablet: 1 tab(s) orally once a day (in the evening)   levothyroxine 25 mcg (0.025 mg) oral tablet: 1 tab(s) orally once a day  meclizine 25 mg oral tablet: 1 tab(s) orally 2 times a day as needed for  dizziness  Metoprolol Succinate ER 25 mg oral tablet, extended release: 1 tab(s) orally once a day (at bedtime)  nitroglycerin 0.4 mg/hr transdermal film, extended release: 1 patch transdermal once a day  Xarelto 20 mg oral tablet: 1 tab(s) orally once a day (in the evening)   levothyroxine 25 mcg (0.025 mg) oral tablet: 1 tab(s) orally once a day  meclizine 25 mg oral tablet: 1 tab(s) orally 2 times a day as needed for  dizziness  meclizine 25 mg oral tablet: 1 tab(s) orally 2 times a day as needed for  dizziness  Metoprolol Succinate ER 25 mg oral tablet, extended release: 1 tab(s) orally once a day (at bedtime)  nitroglycerin 0.4 mg/hr transdermal film, extended release: 1 patch transdermal once a day  Xarelto 20 mg oral tablet: 1 tab(s) orally once a day (in the evening)

## 2024-07-29 NOTE — PHYSICAL THERAPY INITIAL EVALUATION ADULT - RANGE OF MOTION EXAMINATION, REHAB EVAL
-- DO NOT REPLY / DO NOT REPLY ALL --  -- Message is from Engagement Center Operations ([facility]) --    General Patient Message: Patient called back . She gave writer verbal authorization to order the pain pump medication from [facility]    Caller Information       Type Contact Phone/Fax    04/25/2023 01:06 PM CDT Phone (Outgoing) Nieves Abraham (Self) [phone] (M)    04/25/2023 01:14 PM CDT Phone (Incoming) Nieves Abraham (Self) [phone] (M)        Alternative phone number:     Can a detailed message be left? Yes    Message Turnaround: [facility]:    Refer to site's KB page for routing instructions    Please give this turnaround time to the caller:   \"You can expect to receive a response 2-3 business days after your provider's clinical team reviews the message\"               bilateral upper extremity ROM was WFL (within functional limits)/bilateral lower extremity ROM was WFL (within functional limits)

## 2024-07-29 NOTE — EEG REPORT - NS EEG TEXT BOX
[facility] Department of Neurology  Inpatient Routine-EEG Report      Patient Name:	GEO DOHERTY    :	1935  MRN:	[id number]  Study Date/Time:	2024, 5:31:07 PM  Referred by:	-    Brief Clinical History:  GEO DOHERTY is a 88 year old Female; study performed to investigate for seizures or markers of epilepsy.   Diagnosis Code: R55 syncope and collapse    Patient Medication:  TYLENOL    SYNTHROUID    NITRO-DUR    XARELTO    MELATONIN    ZOFRAN    -      Acquisition Details:  Electroencephalography was acquired using a minimum of 21 channels on an [employer] system v 9.3.1 with electrode placement according to the standard International 10-20 system following ACNS (American Clinical Neurophysiology Society) guidelines.  Anterior temporal T1 and T2 electrodes were utilized whenever possible.   The XLTEK automated spike & seizure detections were all reviewed in detail, in addition to the entire raw EEG.    Findings:  Background:  continuous.   Voltage:  Normal (20uV)  Organization:  Appropriate anterior-posterior gradient  Posterior Dominant Rhythm:  7-8 Hz symmetric, well-organized, and well-modulated  Variability:  Yes	Reactivity:  Yes  Sleep:  Absent.  Focal abnormalities:  No persistent asymmetries of voltage or frequency.  Interictal Activity:  None  Focal Slowing:  None  Generalized Slowing:  No  Events:  1)	No electrographic seizures or significant clinical events.  Provocations:  1)	Hyperventilation: was not performed.  2)	Photic stimulation: was not performed.  Impression:  Normal REEG    Clinical Correlation:  Normal study does not exclude diagnosis of seizure disorder    Rashida Boo MD  Attending Neurologist, Division of Epilepsy

## 2024-07-29 NOTE — PROGRESS NOTE ADULT - SUBJECTIVE AND OBJECTIVE BOX
Patient is a 88y old  Female who presents with a chief complaint of pre-syncope (07-29-24)      Pt seen and examined at bedside. No CP or SOB. feeling better          PAST MEDICAL & SURGICAL HISTORY:  Atrial fibrillation, unspecified type    Hypothyroidism, unspecified type    Hyperlipidemia, unspecified hyperlipidemia type    Conduction disorder of the heart    H/O cardiac pacemaker        VITAL SIGNS (Last 24 hrs):  T(C): 36.4 (07-29-24 @ 07:27), Max: 36.4 (07-28-24 @ 20:10)  HR: 94 (07-29-24 @ 07:27) (60 - 94)  BP: 156/83 (07-29-24 @ 07:27) (114/75 - 156/83)  RR: 18 (07-29-24 @ 07:27) (18 - 18)  SpO2: 99% (07-29-24 @ 07:27) (98% - 99%)  Wt(kg): --  Daily     Daily     I&O's Summary      PHYSICAL EXAM:  GENERAL: NAD, well-developed  HEAD:  Atraumatic, Normocephalic  EYES: EOMI, PERRLA, conjunctiva and sclera clear  NECK: Supple, No JVD  CHEST/LUNG: Clear to auscultation bilaterally; No wheeze  HEART: Regular rate and rhythm; No murmurs, rubs, or gallops  ABDOMEN: Soft, Nontender, Nondistended; Bowel sounds present  EXTREMITIES:  2+ Peripheral Pulses, No clubbing, cyanosis, or edema  PSYCH: AAOx3  NEUROLOGY: non-focal  SKIN: No rashes or lesions    Labs Reviewed  Spoke to patient in regards to abnormal labs.    CBC Full  -  ( 29 Jul 2024 07:55 )  WBC Count : 6.09 K/uL  Hemoglobin : 12.7 g/dL  Hematocrit : 38.2 %  Platelet Count - Automated : 174 K/uL  Mean Cell Volume : 87.4 fL  Mean Cell Hemoglobin : 29.1 pg  Mean Cell Hemoglobin Concentration : 33.2 g/dL  Auto Neutrophil # : 3.51 K/uL  Auto Lymphocyte # : 1.53 K/uL  Auto Monocyte # : 0.79 K/uL  Auto Eosinophil # : 0.20 K/uL  Auto Basophil # : 0.05 K/uL  Auto Neutrophil % : 57.6 %  Auto Lymphocyte % : 25.1 %  Auto Monocyte % : 13.0 %  Auto Eosinophil % : 3.3 %  Auto Basophil % : 0.8 %    BMP:    07-29 @ 07:55    Blood Urea Nitrogen - 17  Calcium - 8.9  Carbond Dioxide - 22  Chloride - 106  Creatinine - 0.9  Glucose - 87  Potassium - 4.3  Sodium - 141      Hemoglobin A1c -   PT/INR - ( 29 Jul 2024 07:55 )   PT: 15.30 sec;   INR: 1.34 ratio         PTT - ( 29 Jul 2024 07:55 )  PTT:33.5 sec  Urine Culture:  07-27 @ 12:33 Urine culture: --    Culture Results:   >=3 organisms. Probable collection contamination.  Method Type: --  Organism: --  Organism Identification: --  Specimen Source: Clean Catch None        COVID Labs  CRP:  <3.0 (07-29-24)      D-Dimer:            Imaging reviewed independently and reviewed read    < from: CT Head No Cont (07.28.24 @ 16:21) >  IMPRESSION: No acute process seen    < end of copied text >        MEDICATIONS  (STANDING):  famotidine    Tablet 20 milliGRAM(s) Oral daily  levothyroxine 25 MICROGram(s) Oral daily  meclizine 25 milliGRAM(s) Oral two times a day  nitroglycerin    Patch 0.4 mG/Hr(s) 1 patch Transdermal daily  pantoprazole   Suspension 40 milliGRAM(s) Oral daily  polyethylene glycol 3350 17 Gram(s) Oral two times a day  rivaroxaban 20 milliGRAM(s) Oral with dinner    MEDICATIONS  (PRN):  acetaminophen     Tablet .. 650 milliGRAM(s) Oral every 6 hours PRN Temp greater or equal to 38C (100.4F), Mild Pain (1 - 3)  aluminum hydroxide/magnesium hydroxide/simethicone Suspension 30 milliLiter(s) Oral every 4 hours PRN Dyspepsia  melatonin 3 milliGRAM(s) Oral at bedtime PRN Insomnia  ondansetron Injectable 4 milliGRAM(s) IV Push every 8 hours PRN Nausea and/or Vomiting

## 2024-07-29 NOTE — PHYSICAL THERAPY INITIAL EVALUATION ADULT - PERTINENT HX OF CURRENT PROBLEM, REHAB EVAL
Patient is a 88 y female pmhx htn hypothyroidism afib on xarelto ppm in place pw/ multiple episodes of near syncope, pt has been having episodes where she is feels a warm sensation begin in her feet and climb up to her head. She feels faint, has chest tightness but does not pass out. She fractured her wrist three weeks ago from a fall but was due to imbalance when she was tying her shoes and did not feel similar to her pre-syncopal episodes. patient was on midodrine in the past. Otherwise denies any fever, chills, headache, changes in vision, cough, congestion, cp, palpitations, sob, n/v/d, abd pain, constipation, urinary complaints, lower extremity pain/swelling.

## 2024-07-29 NOTE — DISCHARGE NOTE PROVIDER - CARE PROVIDER_API CALL
DAMEON LOCO  [id number] [address]  Phone: [phone]  Fax: [phone]  Follow Up Time: 1 week

## 2024-07-29 NOTE — PHYSICAL THERAPY INITIAL EVALUATION ADULT - ADDITIONAL COMMENTS
Pt. lives alone in an apartment with elevator access. Denies use of AD or DME PTA. Independent at baseline. States that she has not been ambulating as much since recent R wrist fx.

## 2024-07-29 NOTE — DISCHARGE NOTE PROVIDER - NSDCCPCAREPLAN_GEN_ALL_CORE_FT
PRINCIPAL DISCHARGE DIAGNOSIS  Diagnosis: Near syncope  Assessment and Plan of Treatment: You came to the hospital because of dizziness and near-syncope. After a thorough evaluation, we found no significant abnormalities. We’ve treated you with medication to help manage your dizziness, and your condition has improved. It’s important that you schedule a follow-up appointment with your primary care physician. They will review your overall health and any further steps that may be needed to address your symptoms. Keep track of any new or worsening symptoms. If you experience significant dizziness, fainting, or any other concerning symptoms, seek medical attention promptly. To prevent falls or accidents related to dizziness, be cautious when standing up quickly or moving around. It might help to sit or lie down if you feel dizzy.     PRINCIPAL DISCHARGE DIAGNOSIS  Diagnosis: Near syncope  Assessment and Plan of Treatment: You came to the hospital because of dizziness and near-syncope. After a thorough evaluation, we found no significant abnormalities. We’ve treated you with medication to help manage your dizziness, and your condition has improved. It’s important that you schedule a follow-up appointment with your primary care physician. They will review your overall health and any further steps that may be needed to address your symptoms. Keep track of any new or worsening symptoms. If you experience significant dizziness, fainting, or any other concerning symptoms, seek medical attention promptly. To prevent falls or accidents related to dizziness, be cautious when standing up quickly or moving around. It might help to sit or lie down if you feel dizzy.  After discharge, you will need to:   - Follow up with your primary care doctor within 1-2 weeks  - Follow up with Neurointerventional Radiology within 2 weeks for CT head findings  - Take all the medications you were discharged with, unless otherwise instructed by your healthcare provider(s).   Please follow up with your providers by calling them to make an appointment so that you can see them in 1-2weeks; bring your paperwork from this hospital stay to that visit. You can access your visit information by signing up for an account for the patient portal at [email or web address]   Seek immediate medical attention if you develop fevers, chills, chest pain, shortness of breath, nausea and vomiting, abdominal pain, passing out, weakness or numbness or tingling on one side of your body, or any other concerning signs or symptoms.

## 2024-07-29 NOTE — DISCHARGE NOTE PROVIDER - NSDCFUSCHEDAPPT_GEN_ALL_CORE_FT
[facility] Physician [facility]  CARDIOLOGY [address] [facility]  Scheduled Appointment: 08/02/2024

## 2024-07-30 ENCOUNTER — TRANSCRIPTION ENCOUNTER (OUTPATIENT)
Age: 89
End: 2024-07-30

## 2024-07-30 VITALS
RESPIRATION RATE: 18 BRPM | HEART RATE: 65 BPM | TEMPERATURE: 97 F | DIASTOLIC BLOOD PRESSURE: 68 MMHG | SYSTOLIC BLOOD PRESSURE: 137 MMHG

## 2024-07-30 PROCEDURE — 93010 ELECTROCARDIOGRAM REPORT: CPT

## 2024-07-30 RX ADMIN — PANTOPRAZOLE SODIUM 40 MILLIGRAM(S): 20 TABLET, DELAYED RELEASE ORAL at 12:10

## 2024-07-30 RX ADMIN — Medication 25 MICROGRAM(S): at 05:20

## 2024-07-30 RX ADMIN — Medication 17 GRAM(S): at 05:20

## 2024-07-30 NOTE — DISCHARGE NOTE NURSING/CASE MANAGEMENT/SOCIAL WORK - NSDCPEFALRISK_GEN_ALL_CORE
For information on Fall & Injury Prevention, visit: [email or web address] OR  [email or web address] OR  https://www.cdc.gov/steadi/patient.html

## 2024-07-30 NOTE — CHART NOTE - NSCHARTNOTEFT_GEN_A_CORE
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 No further workup from neurology standpoint at this time. Please have patient followup with Neuro IR as outpatient for aneurysm.
Patient seen and examined in am in no acute distress  denied any chest pain, dizziness or shortness of breath   had no episodes today and vitals remained stable  AAOX3  CVS S1S2 RRR  LUNG CLEAR   ABD NT ND   EXT NO EDEMA   NO NEURO DEFICITS   AMBULATING WELL IN ROOM    workup for near syncope negative   neurology signed off and patient and son informed by me about incidental finding of 4mm aneurysm and the need for close follow up with PCP and neurology   Son also understands that she needs outpatient follow up with her cardiologist( he has retired and they will assume care with a new cardiologist)   Cleared medically for discharge   time spent in total 35 min DC narrative will be done by resident
Device: [employer] DC PPM    Indication: pre-syncope  Interrogation complete. Device functioning normally.     Mode: AAIR-DDDR  bpm  Dependent: No  Battery: 12 months  Underlying Rhythm: SB 40s    Events: None    Recommendations: Routine f/u as outpatient with Dr. Aldrich in 1 month    EPS [id number]

## 2024-07-30 NOTE — DISCHARGE NOTE NURSING/CASE MANAGEMENT/SOCIAL WORK - PATIENT PORTAL LINK FT
You can access the FollowMyHealth Patient Portal offered by [facility] by registering at the following website: [email or web address]. By joining [facility]’s FollowMyHealth portal, you will also be able to view your health information using other applications (apps) compatible with our system.

## 2024-08-03 LAB
CULTURE RESULTS: SIGNIFICANT CHANGE UP
SPECIMEN SOURCE: SIGNIFICANT CHANGE UP

## 2024-08-06 DIAGNOSIS — Z79.890 HORMONE REPLACEMENT THERAPY: ICD-10-CM

## 2024-08-06 DIAGNOSIS — Z79.82 LONG TERM (CURRENT) USE OF ASPIRIN: ICD-10-CM

## 2024-08-06 DIAGNOSIS — I72.5 ANEURYSM OF OTHER PRECEREBRAL ARTERIES: ICD-10-CM

## 2024-08-06 DIAGNOSIS — Z91.81 HISTORY OF FALLING: ICD-10-CM

## 2024-08-06 DIAGNOSIS — R42 DIZZINESS AND GIDDINESS: ICD-10-CM

## 2024-08-06 DIAGNOSIS — Z79.01 LONG TERM (CURRENT) USE OF ANTICOAGULANTS: ICD-10-CM

## 2024-08-06 DIAGNOSIS — E03.9 HYPOTHYROIDISM, UNSPECIFIED: ICD-10-CM

## 2024-08-06 DIAGNOSIS — I10 ESSENTIAL (PRIMARY) HYPERTENSION: ICD-10-CM

## 2024-08-06 DIAGNOSIS — I48.91 UNSPECIFIED ATRIAL FIBRILLATION: ICD-10-CM

## 2024-08-06 DIAGNOSIS — K21.9 GASTRO-ESOPHAGEAL REFLUX DISEASE WITHOUT ESOPHAGITIS: ICD-10-CM

## 2024-08-06 DIAGNOSIS — R55 SYNCOPE AND COLLAPSE: ICD-10-CM

## 2024-08-06 DIAGNOSIS — Z95.5 PRESENCE OF CORONARY ANGIOPLASTY IMPLANT AND GRAFT: ICD-10-CM

## 2024-08-14 ENCOUNTER — NON-APPOINTMENT (OUTPATIENT)
Age: 89
End: 2024-08-14

## 2024-08-14 ENCOUNTER — APPOINTMENT (OUTPATIENT)
Dept: CARDIOLOGY | Facility: CLINIC | Age: 89
End: 2024-08-14
Payer: MEDICARE

## 2024-08-14 PROCEDURE — 93296 REM INTERROG EVL PM/IDS: CPT

## 2024-08-14 PROCEDURE — 93294 REM INTERROG EVL PM/LDLS PM: CPT

## 2024-09-27 ENCOUNTER — APPOINTMENT (OUTPATIENT)
Dept: CARDIOLOGY | Facility: CLINIC | Age: 89
End: 2024-09-27
Payer: MEDICARE

## 2024-09-27 VITALS
SYSTOLIC BLOOD PRESSURE: 120 MMHG | HEART RATE: 60 BPM | HEIGHT: 60 IN | OXYGEN SATURATION: 98 % | WEIGHT: 162 LBS | BODY MASS INDEX: 31.8 KG/M2 | DIASTOLIC BLOOD PRESSURE: 78 MMHG

## 2024-09-27 DIAGNOSIS — M79.89 OTHER SPECIFIED SOFT TISSUE DISORDERS: ICD-10-CM

## 2024-09-27 DIAGNOSIS — I10 ESSENTIAL (PRIMARY) HYPERTENSION: ICD-10-CM

## 2024-09-27 DIAGNOSIS — R42 DIZZINESS AND GIDDINESS: ICD-10-CM

## 2024-09-27 DIAGNOSIS — I48.0 PAROXYSMAL ATRIAL FIBRILLATION: ICD-10-CM

## 2024-09-27 DIAGNOSIS — Z95.0 PRESENCE OF CARDIAC PACEMAKER: ICD-10-CM

## 2024-09-27 DIAGNOSIS — I48.92 UNSPECIFIED ATRIAL FLUTTER: ICD-10-CM

## 2024-09-27 PROCEDURE — 93000 ELECTROCARDIOGRAM COMPLETE: CPT

## 2024-09-27 PROCEDURE — 99214 OFFICE O/P EST MOD 30 MIN: CPT

## 2024-09-27 RX ORDER — ACETAMINOPHEN, CAFFEINE 500; 65 MG/1; MG/1
TABLET, FILM COATED ORAL
Refills: 0 | Status: ACTIVE | COMMUNITY

## 2024-09-27 RX ORDER — MECLIZINE HYDROCHLORIDE 25 MG/1
25 TABLET ORAL
Refills: 0 | Status: ACTIVE | COMMUNITY

## 2024-09-27 RX ORDER — OMEPRAZOLE 20 MG/1
20 CAPSULE, DELAYED RELEASE ORAL DAILY
Refills: 0 | Status: ACTIVE | COMMUNITY

## 2024-10-01 NOTE — HISTORY OF PRESENT ILLNESS
[FreeTextEntry1] : Ms. Saleem is an 88-year-old woman with history of paroxysmal AF, sinus arrest s/p PPM in 2014, non-obstructive CAD, dyslipidemia, and hypertension presenting for follow up.  Patient previously followed with Dr. Phelan and was last seen by him in office 11/2023.  She was admitted in July for episodes of near-syncope. Was evaluated by EP, noted to have normal PPM function without arrhythmia on interrogation. Was evaluated by neurology, who deemed her symptoms were unlikely to be neurological in origin.  Device interrogation form 8/2024: 13 months remaining, no events for review, 60% AS-VS, 40% AP-VS  TTE 7/2024: normal systolic function, mild MR, normal LA dimensions CTA H/N: no major vascular stenosis or occlusion; persistent fetal circulation of the right posterior cerebral artery from the right posterior communicating artery. 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 [facility] 2014: normal coronaries ([facility]) CCTA 11/2023: CAC 62 (12%ile), mild LAD and RCA stenoses  Labs: - , , TG 70, HDL 57 - A1c 5.7%, TSH 5.58 - Cr 0.9, K 4.3  Meds: - Xarelto 20mg - Metoprolol succinate 25mg - Levothyroxine 25mcg - NTG 0.1mg/hr patch - Meclizine

## 2024-10-01 NOTE — PHYSICAL EXAM

## 2024-10-01 NOTE — HISTORY OF PRESENT ILLNESS
[FreeTextEntry1] : Ms. Saleem is an 88-year-old woman with history of paroxysmal AF, sinus arrest s/p PPM in 2014, non-obstructive CAD, dyslipidemia, and hypertension presenting for follow up.  Patient previously followed with Dr. Phelan and was last seen by him in office 11/2023.  She was admitted in July for episodes of near-syncope. Was evaluated by EP, noted to have normal PPM function without arrhythmia on interrogation. Was evaluated by neurology, who deemed her symptoms were unlikely to be neurological in origin.  Device interrogation form 8/2024: 13 months remaining, no events for review, 60% AS-VS, 40% AP-VS  TTE 7/2024: normal systolic function, mild MR, normal LA dimensions CTA H/N: no major vascular stenosis or occlusion; persistent fetal circulation of the right posterior cerebral artery from the right posterior communicating artery. 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 [facility] 2014: normal coronaries ([facility]) CCTA 11/2023: CAC 62 (12%ile), mild LAD and RCA stenoses  Labs: - , , TG 70, HDL 57 - A1c 5.7%, TSH 5.58 - Cr 0.9, K 4.3  Meds: - Xarelto 20mg - Metoprolol succinate 25mg - Levothyroxine 25mcg - NTG 0.1mg/hr patch - Meclizine

## 2024-10-01 NOTE — ASSESSMENT
[FreeTextEntry1] : Ms. Saleem is an 88-year-old woman with history of paroxysmal AF, sinus arrest s/p PPM in 2014, non-obstructive CAD, dyslipidemia, and hypertension presenting for follow up.  Impression: (1) Sinus arrest s/p PPM 2014, due for generator change 2025, following with EP (2) Paroxysmal AF by history, CHADSVASc at least 4 (Age +2, HTN, F) (3) Dyslipidemia, not on LLT,  (4) HTN (5) Non-obstructive CAD by CCTA 11/2023: CAC 62 (12%ile) with mild LAD and RCA stenoses  Plan: - Compressive stockings for likely venous insufficiency; instructions and Rx were provided - Continue DOAC for hx of AF - She is not currently on a statin, unclear why - recommend addition of rosuvastatin 10mg. - She is on metoprolol and NTG patch as antianginals - prescribed years ago, possibly for presumed microvascular angina? Low threshold to discontinue the NTG patch in favor of Imdur.  RTC 3-6 months with labs, sooner as needed

## 2024-10-01 NOTE — PHYSICAL EXAM

## 2024-11-13 ENCOUNTER — APPOINTMENT (OUTPATIENT)
Dept: CARDIOLOGY | Facility: CLINIC | Age: 89
End: 2024-11-13

## 2024-12-16 ENCOUNTER — APPOINTMENT (OUTPATIENT)
Dept: CARDIOLOGY | Facility: CLINIC | Age: 89
End: 2024-12-16

## 2025-01-16 ENCOUNTER — APPOINTMENT (OUTPATIENT)
Dept: CARDIOLOGY | Facility: CLINIC | Age: 89
End: 2025-01-16

## 2025-01-27 NOTE — PATIENT PROFILE ADULT - HAVE YOU EXPERIENCED VIOLENCE OR A TRAUMATIC EVENT?
Copied from CRM #[id number]. Topic: MW Medication/Rx - MW Rx Refill  >> Jan 27, 2025  7:51 AM Helene K wrote:  Kris Ferro called to request a medication refill and is out of medications or critically low during working hours. Medication is:  Listed on Med Management list. Care Site to process refill: Selected 'Wrap Up CRM' and created new Refill Med Encounter after clicking 'Convert to Clinical Call'. Selected reason for call 'Refill Request'. Pended medication. Sent Med template and routed as high priority to appropriate clinician pool. Readback full message.-- DO NOT REPLY / DO NOT REPLY ALL --  -- This inbox is not monitored. If this was sent to the wrong provider or department, reroute message to P [facility] Reroute pool. --  -- Message is from [facility] ([facility]) --      Message Type:  Refill Medication   Refill request for Pended medication named: Needs medication for gout   Preferred pharmacy verified, and selected.        Is the patient OUT of Medication?  Yes and During Work Hours Medication Refills handled by [facility] Clinical - route as high priority to [facility] CLINICAL MSG pool. Patient has been advised it will be addressed within 1 business day.     Message:                    no

## 2025-03-11 ENCOUNTER — APPOINTMENT (OUTPATIENT)
Dept: CARDIOLOGY | Facility: CLINIC | Age: 89
End: 2025-03-11

## 2025-03-21 ENCOUNTER — APPOINTMENT (OUTPATIENT)
Dept: CARDIOLOGY | Facility: CLINIC | Age: 89
End: 2025-03-21
Payer: MEDICARE

## 2025-03-21 VITALS
HEART RATE: 63 BPM | DIASTOLIC BLOOD PRESSURE: 82 MMHG | HEIGHT: 60 IN | BODY MASS INDEX: 31.8 KG/M2 | WEIGHT: 162 LBS | SYSTOLIC BLOOD PRESSURE: 122 MMHG

## 2025-03-21 DIAGNOSIS — I49.5 SICK SINUS SYNDROME: ICD-10-CM

## 2025-03-21 DIAGNOSIS — I10 ESSENTIAL (PRIMARY) HYPERTENSION: ICD-10-CM

## 2025-03-21 DIAGNOSIS — E78.5 HYPERLIPIDEMIA, UNSPECIFIED: ICD-10-CM

## 2025-03-21 DIAGNOSIS — I48.92 UNSPECIFIED ATRIAL FLUTTER: ICD-10-CM

## 2025-03-21 PROCEDURE — 93000 ELECTROCARDIOGRAM COMPLETE: CPT

## 2025-03-21 PROCEDURE — G2211 COMPLEX E/M VISIT ADD ON: CPT

## 2025-03-21 PROCEDURE — 99214 OFFICE O/P EST MOD 30 MIN: CPT

## 2025-03-24 ENCOUNTER — NON-APPOINTMENT (OUTPATIENT)
Age: 89
End: 2025-03-24

## 2025-03-24 ENCOUNTER — APPOINTMENT (OUTPATIENT)
Dept: CARDIOLOGY | Facility: CLINIC | Age: 89
End: 2025-03-24
Payer: MEDICARE

## 2025-03-24 PROCEDURE — 93296 REM INTERROG EVL PM/IDS: CPT

## 2025-03-24 PROCEDURE — 93294 REM INTERROG EVL PM/LDLS PM: CPT

## 2025-05-07 ENCOUNTER — APPOINTMENT (OUTPATIENT)
Dept: ELECTROPHYSIOLOGY | Facility: CLINIC | Age: 89
End: 2025-05-07
Payer: MEDICARE

## 2025-05-07 VITALS
DIASTOLIC BLOOD PRESSURE: 80 MMHG | WEIGHT: 170 LBS | BODY MASS INDEX: 33.38 KG/M2 | HEIGHT: 60 IN | SYSTOLIC BLOOD PRESSURE: 122 MMHG

## 2025-05-07 PROCEDURE — 99214 OFFICE O/P EST MOD 30 MIN: CPT

## 2025-05-07 PROCEDURE — 93000 ELECTROCARDIOGRAM COMPLETE: CPT | Mod: 59

## 2025-05-07 PROCEDURE — 93280 PM DEVICE PROGR EVAL DUAL: CPT

## 2025-05-20 ENCOUNTER — NON-APPOINTMENT (OUTPATIENT)
Age: 89
End: 2025-05-20

## 2025-07-24 ENCOUNTER — NON-APPOINTMENT (OUTPATIENT)
Age: 89
End: 2025-07-24

## 2025-07-24 ENCOUNTER — APPOINTMENT (OUTPATIENT)
Dept: CARDIOLOGY | Facility: CLINIC | Age: 89
End: 2025-07-24
Payer: MEDICARE

## 2025-07-24 PROCEDURE — 93296 REM INTERROG EVL PM/IDS: CPT

## 2025-07-24 PROCEDURE — 93294 REM INTERROG EVL PM/LDLS PM: CPT
